# Patient Record
Sex: MALE | Race: WHITE | Employment: OTHER | URBAN - METROPOLITAN AREA
[De-identification: names, ages, dates, MRNs, and addresses within clinical notes are randomized per-mention and may not be internally consistent; named-entity substitution may affect disease eponyms.]

---

## 2018-06-26 ENCOUNTER — HOSPITAL ENCOUNTER (OUTPATIENT)
Age: 73
Discharge: HOME | End: 2018-06-28
Payer: MEDICARE

## 2018-06-26 LAB — PROSTATE SPECIFIC ANTIGEN: 3.11 NG/ML (ref 0–4)

## 2018-06-26 PROCEDURE — G0103 PSA SCREENING: HCPCS

## 2018-06-26 PROCEDURE — 84520 ASSAY OF UREA NITROGEN: CPT

## 2018-06-26 PROCEDURE — 82565 ASSAY OF CREATININE: CPT

## 2018-06-29 LAB
BUN BLDV-MCNC: 20 MG/DL (ref 8–23)
CREAT SERPL-MCNC: 0.7 MG/DL (ref 0.7–1.2)
GFR AFRICAN AMERICAN: >60
GFR NON-AFRICAN AMERICAN: >60 ML/MIN/1.73

## 2021-01-29 ENCOUNTER — IMMUNIZATION (OUTPATIENT)
Dept: PRIMARY CARE CLINIC | Age: 76
End: 2021-01-29
Payer: MEDICARE

## 2021-01-29 PROCEDURE — 0001A COVID-19, PFIZER VACCINE 30MCG/0.3ML DOSE: CPT | Performed by: PHYSICIAN ASSISTANT

## 2021-01-29 PROCEDURE — 91300 COVID-19, PFIZER VACCINE 30MCG/0.3ML DOSE: CPT | Performed by: PHYSICIAN ASSISTANT

## 2021-02-19 ENCOUNTER — IMMUNIZATION (OUTPATIENT)
Dept: PRIMARY CARE CLINIC | Age: 76
End: 2021-02-19
Payer: MEDICARE

## 2021-02-19 PROCEDURE — 91300 COVID-19, PFIZER VACCINE 30MCG/0.3ML DOSE: CPT | Performed by: NURSE PRACTITIONER

## 2021-02-19 PROCEDURE — 0002A COVID-19, PFIZER VACCINE 30MCG/0.3ML DOSE: CPT | Performed by: NURSE PRACTITIONER

## 2021-08-24 ENCOUNTER — TELEPHONE (OUTPATIENT)
Dept: ADMINISTRATIVE | Age: 76
End: 2021-08-24

## 2021-08-24 NOTE — TELEPHONE ENCOUNTER
NP returning call to schedule NP with Cardiology - ASAP. Patient Appointment Form:      PCP: Dr. David Hunt  Referring: Dr. David Hunt    Has the Patient:    Seen a Cardiologist? No    Had a heart catheterization? No    Had heart surgery? No    Had a stress test or nuclear stress test? No    Had an echocardiogram? No    Had a vascular ultrasound? No    Had a 24/48 heart monitor or extended cardiac event monitor? No    Had recent blood work in the last 6 months? Had a pacemaker/ICD/ILR implant? No    Seen an Electrophysiologist? No        Will send records via: PCP recs should be at [address] [place] [address] EKG. Date & time of appointment:  8/30/21 @ 8:30 a.m. with Dr. Nelson Ardon.

## 2021-08-30 ENCOUNTER — OFFICE VISIT (OUTPATIENT)
Dept: CARDIOLOGY CLINIC | Age: 76
End: 2021-08-30
Payer: MEDICARE

## 2021-08-30 VITALS
DIASTOLIC BLOOD PRESSURE: 82 MMHG | HEART RATE: 55 BPM | HEIGHT: 68 IN | SYSTOLIC BLOOD PRESSURE: 129 MMHG | WEIGHT: 150.4 LBS | BODY MASS INDEX: 22.79 KG/M2 | RESPIRATION RATE: 16 BRPM

## 2021-08-30 DIAGNOSIS — I45.2 RBBB (RIGHT BUNDLE BRANCH BLOCK WITH LEFT ANTERIOR FASCICULAR BLOCK): ICD-10-CM

## 2021-08-30 DIAGNOSIS — Z01.810 PREOP CARDIOVASCULAR EXAM: Primary | ICD-10-CM

## 2021-08-30 DIAGNOSIS — M54.50 LOW BACK PAIN, UNSPECIFIED BACK PAIN LATERALITY, UNSPECIFIED CHRONICITY, UNSPECIFIED WHETHER SCIATICA PRESENT: ICD-10-CM

## 2021-08-30 PROBLEM — E78.5 HLD (HYPERLIPIDEMIA): Status: ACTIVE | Noted: 2021-08-30

## 2021-08-30 PROBLEM — I10 HTN (HYPERTENSION): Status: ACTIVE | Noted: 2021-08-30

## 2021-08-30 PROCEDURE — 99204 OFFICE O/P NEW MOD 45 MIN: CPT | Performed by: INTERNAL MEDICINE

## 2021-08-30 PROCEDURE — 93000 ELECTROCARDIOGRAM COMPLETE: CPT | Performed by: INTERNAL MEDICINE

## 2021-08-30 RX ORDER — ROSUVASTATIN CALCIUM 10 MG/1
TABLET, COATED ORAL
COMMUNITY
Start: 2021-07-05

## 2021-08-30 RX ORDER — OMEPRAZOLE 20 MG/1
20 CAPSULE, DELAYED RELEASE ORAL DAILY
COMMUNITY

## 2021-08-30 RX ORDER — PAROXETINE HYDROCHLORIDE 20 MG/1
TABLET, FILM COATED ORAL
COMMUNITY
Start: 2021-08-10

## 2021-08-30 RX ORDER — RAMIPRIL 10 MG/1
CAPSULE ORAL
COMMUNITY
Start: 2021-08-10

## 2021-08-30 RX ORDER — ASPIRIN 81 MG/1
81 TABLET ORAL DAILY
COMMUNITY
End: 2021-08-30 | Stop reason: CLARIF

## 2021-08-30 NOTE — PROGRESS NOTES
Chief Complaint   Patient presents with    Cardiac Clearance       Patient Active Problem List    Diagnosis Date Noted    HTN (hypertension) 08/30/2021    HLD (hyperlipidemia) 08/30/2021       Current Outpatient Medications   Medication Sig Dispense Refill    ramipril (ALTACE) 10 MG capsule TAKE ONE CAPSULE BY MOUTH EVERY DAY      rosuvastatin (CRESTOR) 10 MG tablet TAKE ONE TABLET BY MOUTH EVERY DAY      PARoxetine (PAXIL) 20 MG tablet TAKE ONE TABLET BY MOUTH AT BEDTIME      metFORMIN (GLUCOPHAGE) 500 MG tablet TAKE ONE TABLET BY MOUTH THREE TIMES A DAY      omeprazole (PRILOSEC) 20 MG delayed release capsule Take 20 mg by mouth daily      Multiple Vitamins-Minerals (CENTRUM SILVER 50+MEN PO) Take 1 tablet by mouth daily       No current facility-administered medications for this visit. No Known Allergies    Vitals:    08/30/21 0820   BP: 129/82   Resp: 16   Weight: 150 lb 6.4 oz (68.2 kg)   Height: 5' 8\" (1.727 m)       Social History     Socioeconomic History    Marital status: Unknown     Spouse name: Not on file    Number of children: Not on file    Years of education: Not on file    Highest education level: Not on file   Occupational History    Not on file   Tobacco Use    Smoking status: Not on file   Substance and Sexual Activity    Alcohol use: Not on file    Drug use: Not on file    Sexual activity: Not on file   Other Topics Concern    Not on file   Social History Narrative    Not on file     Social Determinants of Health     Financial Resource Strain:     Difficulty of Paying Living Expenses:    Food Insecurity:     Worried About Running Out of Food in the Last Year:     Oksana of Food in the Last Year:    Transportation Needs:     Lack of Transportation (Medical):      Lack of Transportation (Non-Medical):    Physical Activity:     Days of Exercise per Week:     Minutes of Exercise per Session:    Stress:     Feeling of Stress :    Social Connections:     Frequency of Communication with Friends and Family:     Frequency of Social Gatherings with Friends and Family:     Attends Congregational Services:     Active Member of Clubs or Organizations:     Attends Club or Organization Meetings:     Marital Status:    Intimate Partner Violence:     Fear of Current or Ex-Partner:     Emotionally Abused:     Physically Abused:     Sexually Abused:        History reviewed. No pertinent family history. SUBJECTIVE: Stacia Gant presents to the office today for consult for pre-op evaluation prior to OP back surgery at [facility]  He complains of no new or unstable cardiac symptoms and denies   chest pain, chest pressure/discomfort, claudication, dyspnea, exertional chest pressure/discomfort, fatigue, irregular heart beat, lower extremity edema, near-syncope, orthopnea, palpitations, paroxysmal nocturnal dyspnea, syncope and tachypnea. Golfs, does all household chores. Last ekg was years ago. Review of Systems:   Heart: as above   Lungs: as above   Eyes: denies changes in vision or discharge. Ears: denies changes in hearing or pain. Nose: denies epistaxis or masses   Throat: denies sore throat or trouble swallowing. Neuro: denies numbness, tingling, tremors. Skin: denies rashes or itching. : denies hematuria, dysuria   GI: denies vomiting, diarrhea   Psych: denies mood changed, anxiety, depression. all others negative. Physical Exam   /82   Resp 16   Ht 5' 8\" (1.727 m)   Wt 150 lb 6.4 oz (68.2 kg)   BMI 22.87 kg/m²   Constitutional: Oriented to person, place, and time. Obese No distress. Head: Normocephalic and atraumatic. Eyes: EOM are normal. Pupils are equal, round, and reactive to light. Neck: Normal range of motion. Neck supple. No hepatojugular reflux and no JVD present. Carotid bruit is not present. Cardiovascular: Normal rate, regular rhythm, normal heart sounds and intact distal pulses.   Exam reveals no gallop and no friction rub.  No murmur heard. Pulmonary/Chest: Effort normal and breath sounds normal. No respiratory distress. No wheezes. No rales. Abdominal: Soft. Bowel sounds are normal. No distension and no mass. No tenderness. No rebound and no guarding. Musculoskeletal: Normal range of motion. No edema and no tenderness. Neurological: Alert and oriented to person, place, and time. Skin: Skin is warm and dry. No rash noted. Not diaphoretic. No erythema. Psychiatric: Normal mood and affect. Behavior is normal.     EKG: Sinus bradycardia, RBBB, left axis deviation, rate 55, there are no previous tracings available for comparison. ASSESSMENT AND PLAN:  Patient Active Problem List   Diagnosis    HTN (hypertension)    HLD (hyperlipidemia)       ·  Patient has no high risk clinical predictors of an adverse outcome in surgery, such as recent myocardial infarction, unstable angina, heart failure, rhythm other than sinus, severe obstructive valvular disease,cva, insulin, ckd  · Able to achieve > 4 METS with AODLs  · Normal CV exam   · Multiple risk factors for CAD being addressed by PCP  · EKG with asymptomatic conduction disease - no suggestion of high grade AVB  · Low risk surgical procedure  · RCRI Class I risk (< 1% chance of cardiac complication).        Wilson Huang M.D  [id number] [facility] Card[facility]

## 2021-10-08 ENCOUNTER — TELEPHONE (OUTPATIENT)
Dept: VASCULAR SURGERY | Age: 76
End: 2021-10-08

## 2021-10-08 NOTE — TELEPHONE ENCOUNTER
Received referral from Dr. Livai Flores for abnormal HAJA, left message for patient to schedule appointment with Dr. Sagar Owens.

## 2021-11-02 ENCOUNTER — TELEPHONE (OUTPATIENT)
Dept: VASCULAR SURGERY | Age: 76
End: 2021-11-02

## 2021-11-03 ENCOUNTER — OFFICE VISIT (OUTPATIENT)
Dept: VASCULAR SURGERY | Age: 76
End: 2021-11-03
Payer: MEDICARE

## 2021-11-03 VITALS
BODY MASS INDEX: 22.73 KG/M2 | WEIGHT: 150 LBS | SYSTOLIC BLOOD PRESSURE: 140 MMHG | HEIGHT: 68 IN | DIASTOLIC BLOOD PRESSURE: 80 MMHG

## 2021-11-03 DIAGNOSIS — I73.9 PERIPHERAL VASCULAR DISEASE OF EXTREMITY WITH CLAUDICATION (HCC): Chronic | ICD-10-CM

## 2021-11-03 DIAGNOSIS — I70.223 ATHEROSCLEROSIS OF NATIVE ARTERY OF BOTH LOWER EXTREMITIES WITH REST PAIN (HCC): Primary | ICD-10-CM

## 2021-11-03 PROCEDURE — 99203 OFFICE O/P NEW LOW 30 MIN: CPT | Performed by: SURGERY

## 2021-11-03 RX ORDER — ASPIRIN 81 MG/1
81 TABLET ORAL DAILY
COMMUNITY

## 2021-11-03 NOTE — PROGRESS NOTES
Vascular Surgery Outpatient Consultation        Reason for Consult:    Chief Complaint   Patient presents with   Redd Mondragon Consultation     new pt. complains of leg cramping       Requesting Physician:  No referring provider defined for this encounter. Chief Complaint   Patient presents with    Consultation     new pt. complains of leg cramping       HISTORY OF PRESENT ILLNESS:                The patient is a 68 y.o. male who is referred for evaluation of peripheral vascular disease. He describes left lower extremity pain and discomfort about 30 yards in his calf. He has been limiting his lifestyle. He notices if he walks a little further he starts to get pain into his thigh. He denies any rest pain, lower extremity ulcerations. He smokes on a regular basis 3 cigars/year. He quit for about 10 years in 1991 and then started smoking cigars about 10 years later and has smoked ever since. Prior to [address] he was also smoking. He denies any active chest pain shortness of breath or discomfort. He otherwise is doing well he denies any history of congestive heart failure. .    Past Medical History:        Diagnosis Date    DM (diabetes mellitus) ([facility] [address]     HTN (hypertension)     Hyperlipidemia     Leg pain      Past Surgical History:        Procedure Laterality Date    BACK SURGERY       Current Medications:   Prior to Admission medications    Medication Sig Start Date End Date Taking?  Authorizing Provider   aspirin 81 MG EC tablet Take 81 mg by mouth daily   Yes Historical Provider, MD   ramipril (ALTACE) 10 MG capsule TAKE ONE CAPSULE BY MOUTH EVERY DAY 8/10/21  Yes Historical Provider, MD   rosuvastatin (CRESTOR) 10 MG tablet TAKE ONE TABLET BY MOUTH EVERY DAY 7/5/21  Yes Historical Provider, MD   PARoxetine (PAXIL) 20 MG tablet TAKE ONE TABLET BY MOUTH AT BEDTIME 8/10/21  Yes Historical Provider, MD   metFORMIN (GLUCOPHAGE) 500 MG tablet TAKE ONE TABLET BY MOUTH THREE TIMES A DAY 7/28/21  Yes Historical Provider, MD   omeprazole (PRILOSEC) 20 MG delayed release capsule Take 20 mg by mouth daily   Yes Historical Provider, MD   Multiple Vitamins-Minerals (CENTRUM SILVER 50+MEN PO) Take 1 tablet by mouth daily   Yes Historical Provider, MD     Allergies:  Patient has no known allergies. Social History     Socioeconomic History    Marital status: Unknown     Spouse name: Not on file    Number of children: Not on file    Years of education: Not on file    Highest education level: Not on file   Occupational History    Not on file   Tobacco Use    Smoking status: Current Every Day Smoker     Types: Cigars    Smokeless tobacco: Never Used   Substance and Sexual Activity    Alcohol use: Yes     Alcohol/week: 4.0 standard drinks     Types: 4 Cans of beer per week    Drug use: Never    Sexual activity: Not on file   Other Topics Concern    Not on file   Social History Narrative    Not on file     Social Determinants of Health     Financial Resource Strain:     Difficulty of Paying Living Expenses:    Food Insecurity:     Worried About Running Out of Food in the Last Year:     [address] in the Last Year:    Transportation Needs:     Lack of Transportation (Medical):  Lack of Transportation (Non-Medical):    Physical Activity:     Days of Exercise per Week:     Minutes of Exercise per Session:    Stress:     Feeling of Stress :    Social Connections:     Frequency of Communication with Friends and Family:     Frequency of Social Gatherings with Friends and Family:     Attends Pentecostalism Services:     Active Member of Clubs or Organizations:     Attends Club or Organization Meetings:     Marital Status:    Intimate Partner Violence:     Fear of Current or Ex-Partner:     Emotionally Abused:     Physically Abused:     Sexually Abused:         History reviewed. No pertinent family history.     REVIEW OF SYSTEMS (New symptoms):    Eyes:      Blurred vision:  No [x]/Yes []               Diplopia:   No [x]/Yes []               Vision loss:       No [x]/Yes []   Ears, nose, throat:             Hearing loss:    No [x]/Yes []      Vertigo:   No [x]/Yes []                       Swallowing problem:  No [x]/Yes []               Nose bleeds:   No [x]/Yes []      Voice hoarseness:  No [x]/Yes []  Respiratory:             Cough:   No [x]/Yes []      Pleuritic chest pain:  No [x]/Yes []                        Dyspnea:   No [x]/Yes []      Wheezing:   No [x]/Yes []  Cardiovascular:             Angina:   No [x]/Yes []      Palpitations:   No [x]/Yes []          Claudication:    No [x]/Yes []      Leg swelling:   No [x]/Yes []  Gastrointestinal:             Nausea or vomiting:  No [x]/Yes []               Abdominal pain:  No [x]/Yes []                     Intestinal bleeding: No [x]/Yes []  Musculoskeletal:             Leg pain:   No [x]/Yes []      Back pain:   No [x]/Yes []                    Weakness:   No [x]/Yes []  Neurologic:             Numbness:   No [x]/Yes []      Paralysis:   No [x]/Yes []                       Headaches:   No [x]/Yes []  Hematologic, lymphatic:   Anemia:   No [x]/Yes []              Bleeding or bruising:  No [x]/Yes []              Fevers or chills: No [x]/Yes []  Endocrine:             Temp intolerance:   No [x]/Yes []                       Polydipsia, polyuria:  No [x]/Yes []  Skin:              Rash:    No [x]/Yes []      Ulcers:   No [x]/Yes []              Abnorm pigment: No [x]/Yes []  :              Frequency/urgency:  No [x]/Yes []      Hematuria:    No [x]/Yes []                      Incontinence:    No [x]/Yes []  See history of present illness  PHYSICAL EXAM:  Vitals:    11/03/21 0917   BP: (!) 140/80     General Appearance: alert and oriented to person, place and time, well developed and well- nourished, in no acute distress  Skin: warm and dry, no rash or erythema  Head: normocephalic and atraumatic  Eyes: extraocular eye movements intact, conjunctivae normal  ENT: external ear and ear canal normal bilaterally, nose without deformity  Pulmonary/Chest: clear to auscultation bilaterally- no wheezes, rales or rhonchi, normal air movement, no respiratory distress  Cardiovascular: normal rate, regular rhythm, normal S1 and S2, no murmurs, no carotid bruits  Abdomen: soft, non-tender, non-distended, normal bowel sounds, no masses or organomegaly  Musculoskeletal: normal range of motion, no joint swelling, deformity or tenderness  Neurologic: no cranial nerve deficit, gait, coordination and speech normal  Extremities: Bilateral palpable brachial radial pulses are symmetric. Right femorals palpable popliteals palpable. Posterior tibials palpable. Signals present in the DP    Left extremity demonstrates nonpalpable femoral.  The popliteal is nonpalpable. Signals are present in the posterior tibial.    Problem List Items Addressed This Visit     Peripheral vascular disease of extremity with claudication (HCC) (Chronic)    Relevant Medications    aspirin 81 MG EC tablet            #1 1 peripheral vascular disease. At this point he has a nonpalpable left femoral pulse. This is causing him lifestyle limiting claudication. Were to get a CT of the abdomen pelvis and runoff. My suspicion is most likely has inflow disease based on the physical examination. He will keep a detailed log when he is walking status. We will also prescribe him cilostazol. He has no history of congestive heart failure. I reviewed the cardiology notes and this was corroborated with the patient. No follow-ups on file.

## 2021-11-04 ENCOUNTER — TELEPHONE (OUTPATIENT)
Dept: VASCULAR SURGERY | Age: 76
End: 2021-11-04

## 2021-11-04 RX ORDER — CILOSTAZOL 100 MG/1
100 TABLET ORAL 2 TIMES DAILY
Qty: 60 TABLET | Refills: 3 | Status: ON HOLD
Start: 2021-11-04 | End: 2022-01-24 | Stop reason: HOSPADM

## 2021-11-04 NOTE — TELEPHONE ENCOUNTER
Pt phoned, explained to him the reason we were given by his insurance company for denying our request for CTA. He will begin Pletal, walk as much as possible and follow up with Dr. Sagar Owens x 3 months.

## 2022-01-12 ENCOUNTER — OFFICE VISIT (OUTPATIENT)
Dept: VASCULAR SURGERY | Age: 77
End: 2022-01-12
Payer: MEDICARE

## 2022-01-12 VITALS — SYSTOLIC BLOOD PRESSURE: 116 MMHG | DIASTOLIC BLOOD PRESSURE: 60 MMHG

## 2022-01-12 DIAGNOSIS — I73.9 PERIPHERAL VASCULAR DISEASE OF EXTREMITY WITH CLAUDICATION (HCC): Primary | ICD-10-CM

## 2022-01-12 PROCEDURE — 99213 OFFICE O/P EST LOW 20 MIN: CPT | Performed by: SURGERY

## 2022-01-12 NOTE — PROGRESS NOTES
Vascular Surgery Outpatient Consultation        Reason for Consult:    Chief Complaint   Patient presents with    Circulatory Problem     Follow up PVD, pain left hand. Requesting Physician:  No referring provider defined for this encounter. Chief Complaint   Patient presents with    Circulatory Problem     Follow up PVD, pain left hand. HISTORY OF PRESENT ILLNESS:                The patient is a 68 y.o. male who is referred for evaluation of peripheral vascular disease. He describes left lower extremity pain and discomfort about 30 yards in his calf. He has been limiting his lifestyle. He notices if he walks a little further he starts to get pain into his thigh. He denies any rest pain, lower extremity ulcerations. He smokes on a regular basis 3 cigars/year. He quit for about 10 years in 1991 and then started smoking cigars about 10 years later and has smoked ever since. Prior to [address] he was also smoking. He denies any active chest pain shortness of breath or discomfort. He otherwise is doing well he denies any history of congestive heart failure. .    As above. With the exception adding Pletal.  Overall this has not helped. He still having significant lifestyle limiting claudication where it is impairing his daily physical activity and regular routine. Past Medical History:        Diagnosis Date    DM (diabetes mellitus) ([facility] [address]     HTN (hypertension)     Hyperlipidemia     Leg pain      Past Surgical History:        Procedure Laterality Date    BACK SURGERY       Current Medications:   Prior to Admission medications    Medication Sig Start Date End Date Taking?  Authorizing Provider   aspirin 81 MG EC tablet Take 81 mg by mouth daily   Yes Historical Provider, MD   ramipril (ALTACE) 10 MG capsule TAKE ONE CAPSULE BY MOUTH EVERY DAY 8/10/21  Yes Historical Provider, MD   rosuvastatin (CRESTOR) 10 MG tablet TAKE ONE TABLET BY MOUTH EVERY DAY 7/5/21  Yes Historical Provider, MD PARoxetine (PAXIL) 20 MG tablet TAKE ONE TABLET BY MOUTH AT BEDTIME 8/10/21  Yes Historical Provider, MD   metFORMIN (GLUCOPHAGE) 500 MG tablet TAKE ONE TABLET BY MOUTH THREE TIMES A DAY 7/28/21  Yes Historical Provider, MD   omeprazole (PRILOSEC) 20 MG delayed release capsule Take 20 mg by mouth daily   Yes Historical Provider, MD   Multiple Vitamins-Minerals (CENTRUM SILVER 50+MEN PO) Take 1 tablet by mouth daily   Yes Historical Provider, MD   cilostazol (PLETAL) 100 MG tablet Take 1 tablet by mouth 2 times daily  Patient not taking: Reported on 1/12/2022 11/4/21   Eber Pollard MD     Allergies:  Patient has no known allergies. Social History     Socioeconomic History    Marital status: Unknown     Spouse name: Not on file    Number of children: Not on file    Years of education: Not on file    Highest education level: Not on file   Occupational History    Not on file   Tobacco Use    Smoking status: Current Every Day Smoker     Types: Cigars    Smokeless tobacco: Never Used   Substance and Sexual Activity    Alcohol use: Yes     Alcohol/week: 4.0 standard drinks     Types: 4 Cans of beer per week    Drug use: Never    Sexual activity: Not on file   Other Topics Concern    Not on file   Social History Narrative    Not on file     Social Determinants of Health     Financial Resource Strain:     Difficulty of Paying Living Expenses: Not on file   Food Insecurity:     Worried About Running Out of Food in the Last Year: Not on file    Oksana of Food in the Last Year: Not on file   Transportation Needs:     Lack of Transportation (Medical): Not on file    Lack of Transportation (Non-Medical):  Not on file   Physical Activity:     Days of Exercise per Week: Not on file    Minutes of Exercise per Session: Not on file   Stress:     Feeling of Stress : Not on file   Social Connections:     Frequency of Communication with Friends and Family: Not on file    Frequency of Social Gatherings with Friends and Family: Not on file    Attends Restorationism Services: Not on file    Active Member of Clubs or Organizations: Not on file    Attends Club or Organization Meetings: Not on file    Marital Status: Not on file   Intimate Partner Violence:     Fear of Current or Ex-Partner: Not on file    Emotionally Abused: Not on file    Physically Abused: Not on file    Sexually Abused: Not on file   Housing Stability:     Unable to Pay for Housing in the Last Year: Not on file    Number of [place] in the Last Year: Not on file    Unstable Housing in the Last Year: Not on file        History reviewed. No pertinent family history.     REVIEW OF SYSTEMS (New symptoms):    Eyes:      Blurred vision:  No [x]/Yes []               Diplopia:   No [x]/Yes []               Vision loss:       No [x]/Yes []   Ears, nose, throat:             Hearing loss:    No [x]/Yes []      Vertigo:   No [x]/Yes []                       Swallowing problem:  No [x]/Yes []               Nose bleeds:   No [x]/Yes []      Voice hoarseness:  No [x]/Yes []  Respiratory:             Cough:   No [x]/Yes []      Pleuritic chest pain:  No [x]/Yes []                        Dyspnea:   No [x]/Yes []      Wheezing:   No [x]/Yes []  Cardiovascular:             Angina:   No [x]/Yes []      Palpitations:   No [x]/Yes []          Claudication:    No [x]/Yes []      Leg swelling:   No [x]/Yes []  Gastrointestinal:             Nausea or vomiting:  No [x]/Yes []               Abdominal pain:  No [x]/Yes []                     Intestinal bleeding: No [x]/Yes []  Musculoskeletal:             Leg pain:   No [x]/Yes []      Back pain:   No [x]/Yes []                    Weakness:   No [x]/Yes []  Neurologic:             Numbness:   No [x]/Yes []      Paralysis:   No [x]/Yes []                       Headaches:   No [x]/Yes []  Hematologic, lymphatic:   Anemia:   No [x]/Yes []              Bleeding or bruising:  No [x]/Yes []              Fevers or chills: No [x]/Yes []  Endocrine:             Temp intolerance:   No [x]/Yes []                       Polydipsia, polyuria:  No [x]/Yes []  Skin:              Rash:    No [x]/Yes []      Ulcers:   No [x]/Yes []              Abnorm pigment: No [x]/Yes []  :              Frequency/urgency:  No [x]/Yes []      Hematuria:    No [x]/Yes []                      Incontinence:    No [x]/Yes []  See history of present illness  PHYSICAL EXAM:  Vitals:    01/12/22 0856   BP: 116/60     General Appearance: alert and oriented to person, place and time, well developed and well- nourished, in no acute distress  Skin: warm and dry, no rash or erythema  Head: normocephalic and atraumatic  Eyes: extraocular eye movements intact, conjunctivae normal  ENT: external ear and ear canal normal bilaterally, nose without deformity  Pulmonary/Chest: clear to auscultation bilaterally- no wheezes, rales or rhonchi, normal air movement, no respiratory distress  Cardiovascular: normal rate, regular rhythm, normal S1 and S2, no murmurs, no carotid bruits  Abdomen: soft, non-tender, non-distended, normal bowel sounds, no masses or organomegaly  Musculoskeletal: normal range of motion, no joint swelling, deformity or tenderness  Neurologic: no cranial nerve deficit, gait, coordination and speech normal  Extremities: Bilateral palpable brachial radial pulses are symmetric. Right femorals palpable popliteals palpable. Posterior tibials palpable. Signals present in the DP    Left extremity demonstrates nonpalpable femoral.  The popliteal is nonpalpable. Signals are present in the posterior tibial.    The above examination is unchanged. Problem List Items Addressed This Visit     None            #1 1 peripheral vascular disease. With lifestyle limiting claudication. He is tried Pletal and has not had any additional benefit.   At this point I think we can proceed with an arteriogram with possible intervention secondary to the fact that he is having trouble doing regular daily activity. Risk, benefits, and alternatives, were discussed with the patient including but not limited to bleeding, infection, arteriovenous nerve injury, myocardial infarction, respiratory failure, contrast reaction, contrast reaction leading to kidney failure and/or hemodialysis, injury to the good leg, injury to the bed late, distal embolization, emergency surgery, limb loss of either extremity, sensory or motor disturbance. All questions were answered according to their satisfaction and they wish to proceed. No follow-ups on file.

## 2022-01-21 ENCOUNTER — TELEPHONE (OUTPATIENT)
Dept: CARDIAC CATH/INVASIVE PROCEDURES | Age: 77
End: 2022-01-21

## 2022-01-21 NOTE — TELEPHONE ENCOUNTER
Reminded patient of scheduled procedure on 1/24  Instructions given and COVID questionnaire completed.

## 2022-01-24 ENCOUNTER — HOSPITAL ENCOUNTER (OUTPATIENT)
Dept: CARDIAC CATH/INVASIVE PROCEDURES | Age: 77
Discharge: HOME | End: 2022-01-24
Attending: SURGERY | Admitting: SURGERY
Payer: MEDICARE

## 2022-01-24 VITALS
SYSTOLIC BLOOD PRESSURE: 124 MMHG | DIASTOLIC BLOOD PRESSURE: 82 MMHG | TEMPERATURE: 96.1 F | BODY MASS INDEX: 22.73 KG/M2 | RESPIRATION RATE: 16 BRPM | HEART RATE: 52 BPM | WEIGHT: 150 LBS | HEIGHT: 68 IN | OXYGEN SATURATION: 97 %

## 2022-01-24 DIAGNOSIS — I73.9 PERIPHERAL VASCULAR DISEASE OF EXTREMITY WITH CLAUDICATION (HCC): Primary | Chronic | ICD-10-CM

## 2022-01-24 DIAGNOSIS — I73.9 PVD (PERIPHERAL VASCULAR DISEASE) (HCC): ICD-10-CM

## 2022-01-24 DIAGNOSIS — I73.9 PAD (PERIPHERAL ARTERY DISEASE) (HCC): ICD-10-CM

## 2022-01-24 LAB
ABO/RH: NORMAL
ANION GAP SERPL CALCULATED.3IONS-SCNC: 8 MMOL/L (ref 7–16)
ANTIBODY SCREEN: NORMAL
BUN BLDV-MCNC: 24 MG/DL (ref 6–23)
CALCIUM SERPL-MCNC: 9.7 MG/DL (ref 8.6–10.2)
CHLORIDE BLD-SCNC: 104 MMOL/L (ref 98–107)
CO2: 27 MMOL/L (ref 22–29)
CREAT SERPL-MCNC: 0.9 MG/DL (ref 0.7–1.2)
GFR AFRICAN AMERICAN: >60
GFR NON-AFRICAN AMERICAN: >60 ML/MIN/1.73
GLUCOSE BLD-MCNC: 126 MG/DL (ref 74–99)
HCT VFR BLD CALC: 47.9 % (ref 37–54)
HEMOGLOBIN: 16.6 G/DL (ref 12.5–16.5)
MCH RBC QN AUTO: 32.5 PG (ref 26–35)
MCHC RBC AUTO-ENTMCNC: 34.7 % (ref 32–34.5)
MCV RBC AUTO: 93.9 FL (ref 80–99.9)
PDW BLD-RTO: 12.9 FL (ref 11.5–15)
PLATELET # BLD: 170 E9/L (ref 130–450)
PMV BLD AUTO: 11.2 FL (ref 7–12)
POC ACT LR: 159 SECONDS
POC ACT LR: 324 SECONDS
POTASSIUM REFLEX MAGNESIUM: 4.3 MMOL/L (ref 3.5–5)
RBC # BLD: 5.1 E12/L (ref 3.8–5.8)
SODIUM BLD-SCNC: 139 MMOL/L (ref 132–146)
WBC # BLD: 8.8 E9/L (ref 4.5–11.5)

## 2022-01-24 PROCEDURE — 85027 COMPLETE CBC AUTOMATED: CPT

## 2022-01-24 PROCEDURE — 2580000003 HC RX 258: Performed by: NURSE PRACTITIONER

## 2022-01-24 PROCEDURE — 75716 ARTERY X-RAYS ARMS/LEGS: CPT

## 2022-01-24 PROCEDURE — C1894 INTRO/SHEATH, NON-LASER: HCPCS

## 2022-01-24 PROCEDURE — 6370000000 HC RX 637 (ALT 250 FOR IP)

## 2022-01-24 PROCEDURE — 86901 BLOOD TYPING SEROLOGIC RH(D): CPT

## 2022-01-24 PROCEDURE — 2500000003 HC RX 250 WO HCPCS

## 2022-01-24 PROCEDURE — 85347 COAGULATION TIME ACTIVATED: CPT

## 2022-01-24 PROCEDURE — 37221 PR REVSC OPN/PRQ ILIAC ART W/STNT PLMT & ANGIOPLSTY: CPT | Performed by: SURGERY

## 2022-01-24 PROCEDURE — C1760 CLOSURE DEV, VASC: HCPCS

## 2022-01-24 PROCEDURE — 75716 ARTERY X-RAYS ARMS/LEGS: CPT | Performed by: SURGERY

## 2022-01-24 PROCEDURE — 36200 PLACE CATHETER IN AORTA: CPT

## 2022-01-24 PROCEDURE — 86850 RBC ANTIBODY SCREEN: CPT

## 2022-01-24 PROCEDURE — 37221 HC ILIAC TERRITORY PLASTY STENT: CPT

## 2022-01-24 PROCEDURE — 75625 CONTRAST EXAM ABDOMINL AORTA: CPT | Performed by: SURGERY

## 2022-01-24 PROCEDURE — 2709999900 HC NON-CHARGEABLE SUPPLY

## 2022-01-24 PROCEDURE — 6360000002 HC RX W HCPCS

## 2022-01-24 PROCEDURE — 80048 BASIC METABOLIC PNL TOTAL CA: CPT

## 2022-01-24 PROCEDURE — 36415 COLL VENOUS BLD VENIPUNCTURE: CPT

## 2022-01-24 PROCEDURE — 86900 BLOOD TYPING SEROLOGIC ABO: CPT

## 2022-01-24 PROCEDURE — C1769 GUIDE WIRE: HCPCS

## 2022-01-24 PROCEDURE — C1874 STENT, COATED/COV W/DEL SYS: HCPCS

## 2022-01-24 PROCEDURE — C1887 CATHETER, GUIDING: HCPCS

## 2022-01-24 RX ORDER — SODIUM CHLORIDE 0.9 % (FLUSH) 0.9 %
10 SYRINGE (ML) INJECTION PRN
Status: DISCONTINUED | OUTPATIENT
Start: 2022-01-24 | End: 2022-01-24 | Stop reason: HOSPADM

## 2022-01-24 RX ORDER — HYDRALAZINE HYDROCHLORIDE 20 MG/ML
10 INJECTION INTRAMUSCULAR; INTRAVENOUS EVERY 30 MIN PRN
Status: DISCONTINUED | OUTPATIENT
Start: 2022-01-24 | End: 2022-01-24 | Stop reason: HOSPADM

## 2022-01-24 RX ORDER — SODIUM CHLORIDE 0.9 % (FLUSH) 0.9 %
10 SYRINGE (ML) INJECTION EVERY 12 HOURS SCHEDULED
Status: DISCONTINUED | OUTPATIENT
Start: 2022-01-24 | End: 2022-01-24 | Stop reason: HOSPADM

## 2022-01-24 RX ORDER — SODIUM CHLORIDE 9 MG/ML
25 INJECTION, SOLUTION INTRAVENOUS PRN
Status: DISCONTINUED | OUTPATIENT
Start: 2022-01-24 | End: 2022-01-24 | Stop reason: HOSPADM

## 2022-01-24 RX ORDER — CLOPIDOGREL BISULFATE 75 MG/1
75 TABLET ORAL DAILY
Qty: 30 TABLET | Refills: 3 | Status: SHIPPED | OUTPATIENT
Start: 2022-01-24 | End: 2022-08-30

## 2022-01-24 RX ORDER — SODIUM CHLORIDE 9 MG/ML
INJECTION, SOLUTION INTRAVENOUS CONTINUOUS
Status: DISCONTINUED | OUTPATIENT
Start: 2022-01-24 | End: 2022-01-24 | Stop reason: HOSPADM

## 2022-01-24 RX ADMIN — SODIUM CHLORIDE, PRESERVATIVE FREE 10 ML: 5 INJECTION INTRAVENOUS at 10:51

## 2022-01-24 RX ADMIN — SODIUM CHLORIDE: 9 INJECTION, SOLUTION INTRAVENOUS at 10:52

## 2022-01-24 ASSESSMENT — PAIN SCALES - GENERAL
PAINLEVEL_OUTOF10: 0
PAINLEVEL_OUTOF10: 0

## 2022-01-24 NOTE — PROGRESS NOTES
Discharge instructions printed and reviewed with patient and wife at the bedside. IV removed, dressing applied. Telemetry removed, cleaned, returned to nurses station. Patient in possession of all bedside belongings upon discharge from the unit.

## 2022-01-24 NOTE — OP NOTE
Operative Note      Patient: Shai Stahl  YOB: 1945  MRN: [id number]    Date of procedure: 1/24/2022    Preoperative diagnosis: Lifestyle limiting claudication    Postoperative diagnosis: Same with identification of a left-sided iliac artery occlusion    Findings: The abdominal aorta is widely patent. Calcific atherosclerotic disease is noted. There is no evidence of stenosis. Bilateral renals are patent. Nephrograms are patent. The right extremity demonstrates a common iliac that demonstrates approximately 40% stenosis with some mild Po stenotic dilatation. The external iliac, hypogastric, common femoral, superficial femoral, profunda femoral, popliteal, are all patent. Dominant runoff appears to be the right posterior tibial discontinuous onto the foot. The trifurcation is patent but slow to fill. Left lower extremity demonstrates a hypogastric that is patent and external iliac arteries patent, common femoral, profunda femoris, superficial femoral, popliteal are all patent. Trifurcations patent with a dominant runoff being the posterior tibial vessel. And slow filling of the peroneal and an anterior tibial bilaterally. Surgeon: Thao Monaco MD    Assistant: None    Anesthesia: Conscious sedation consisting of fentanyl, Versed, and cardiopulmonary monitoring combined with local lidocaine    Estimated blood loss: Less than 20 cc    Estimated contrast: See nursing notes    Procedure:  #1 bilateral ultrasound-guided needle access the common femoral arteries  #2 abdominal aortogram with bilateral lower extremity runoff  #3 bilateral iliac artery stent placement. Consisting of a 9 x 38 on the right.   In the 9 x 59 left iliac stent  #4 percutaneous closure of the right common femoral artery and failed percutaneous closure of the left common femoral vessel    Description of the procedure:    Risk, benefits, and alternatives, were discussed with the patient including but not limited to bleeding, infection, arteriovenous nerve injury, myocardial infarction, respiratory failure, contrast reaction, contrast reaction leading to kidney failure and/or hemodialysis, injury to the good leg, injury to the bed late, distal embolization, emergency surgery, limb loss of either extremity, sensory or motor disturbance. All questions were answered according to their satisfaction and they wish to proceed. The day the procedure he was brought into the operating room placed in supine position. He is prepped and draped in the standard sterile fashion. Timeout was taken verify the person the operative site as well as the procedure. At this point lidocaine was used infiltrate the skin and subcutaneous tissue on the right side. We able to puncture this without any difficulty with a micropuncture needle micropuncture wire and sheath. A 4 [place] sheath was placed over a [place] wire. A pigtail catheter was brought up to level suprarenal aortic position and image. We also took RICE and Monegasque imaging at the bifurcation. At this point the wire was maintained. We then punctured the left common femoral vessel in a similar style fashion. We then upsized to a 5 Western Rona sheath flashed and flushed with heparinized saline solution. A glide catheter and a Glidewire advantage was brought to the level of the occlusion. We tried to traverse into the occlusion however it was not easily going. We then upsized the right 4 Western Rona sheath to a 7 Western Rona Ansell sheath. We delivered a neville hook style catheter into the origin of the left common iliac. We used a Glidewire advantage and were easily able to traverse over the left iliac vessel. A glide catheter was then delivered images confirming demonstrated intraluminal placement. Runoff was then taken of the left extremity see above    At this point we are in good position. Patient was heparinized.   We then dilated the left common iliac artery with the sheath and dilator over the wire. We then used the left femoral access point to deliver a new Glidewire advantage without any difficulty in the abdominal aorta. A glide catheter was used to follow and confirm intraluminal placement. We then upsized to a 7 Western Rona Brite tip sheath. Images were taken in the RICE Italian position to make sure that we are good position. A 9 x 38 and a 9 x 59 stent were delivered sheaths were then removed. Images taken demonstrating that we are above the level of the hypogastrics. Both stents were deployed without any difficulty the sheaths the balloon was then swallowed by both sheath advancing the right and left sheath in the abdominal aorta. Completion images demonstrated a widely patent stent without evidence of complicating features. The Ansell sheath was then pulled back into the external iliac on the right and the right extremity was imaged see above. We are in good position for percutaneous closure. The right was done without any difficulty over wire. The wire was removed and pressure held. The left was attempted. However the Pro-glide did not seat very well I did like the way it felt the Pro-glide was removed and a short 7 Wolof sheath was placed over standard wire. There was some oozing around the sheath the patient was given protamine. Once the ACT was adequate the sheath was removed and pressure held. At the end of the procedure everything was accounted for and the patient tolerated procedure well.         Electronically signed by Stephan Flannery MD on 1/24/2022 at 11:00 AM

## 2022-02-01 ENCOUNTER — TELEPHONE (OUTPATIENT)
Dept: VASCULAR SURGERY | Age: 77
End: 2022-02-01

## 2022-02-02 ENCOUNTER — OFFICE VISIT (OUTPATIENT)
Dept: VASCULAR SURGERY | Age: 77
End: 2022-02-02
Payer: MEDICARE

## 2022-02-02 VITALS — DIASTOLIC BLOOD PRESSURE: 82 MMHG | SYSTOLIC BLOOD PRESSURE: 128 MMHG

## 2022-02-02 DIAGNOSIS — I73.9 PERIPHERAL VASCULAR DISEASE OF EXTREMITY WITH CLAUDICATION (HCC): Primary | ICD-10-CM

## 2022-02-02 PROCEDURE — 99213 OFFICE O/P EST LOW 20 MIN: CPT | Performed by: SURGERY

## 2022-02-02 NOTE — PROGRESS NOTES
Vascular Surgery Outpatient Consultation        Reason for Consult:    Chief Complaint   Patient presents with    Circulatory Problem     Follow up PVD       Requesting Physician:  No referring provider defined for this encounter. Chief Complaint   Patient presents with    Circulatory Problem     Follow up PVD       HISTORY OF PRESENT ILLNESS:                The patient is a 68 y.o. male who is referred for evaluation of peripheral vascular disease. He describes left lower extremity pain and discomfort about 30 yards in his calf. He has been limiting his lifestyle. He notices if he walks a little further he starts to get pain into his thigh. He denies any rest pain, lower extremity ulcerations. He smokes on a regular basis 3 cigars/year. He quit for about 10 years in 1991 and then started smoking cigars about 10 years later and has smoked ever since. Prior to [address] [facility] he was also smoking. He denies any active chest pain shortness of breath or discomfort. He otherwise is doing well he denies any history of congestive heart failure. .    As above. Right now he status post iliac stent placement. Overall he is doing well he has no pain no discomfort no embolic phenomenon. He is walking without any difficulty has no claudication. He has cut down on his smoking but is still smoking. He is following up with his family doctor for his recheck labs    Past Medical History:        Diagnosis Date    Arthritis     DM (diabetes mellitus) ([address]     HTN (hypertension)     Hyperlipidemia     Leg pain      Past Surgical History:        Procedure Laterality Date    BACK SURGERY      HX VASCULAR STENT  01/24/2022    [id number] ICAST stent - Right iliac artery, [id number] ICAST stent- Left iliac artery Dr. Debbie Madrigal     Current Medications:   Prior to Admission medications    Medication Sig Start Date End Date Taking?  Authorizing Provider   metFORMIN (GLUCOPHAGE) 500 MG tablet Take 500 mg by mouth 2 times daily (with meals)   Yes Historical Provider, MD   clopidogrel (PLAVIX) 75 MG tablet Take 1 tablet by mouth daily for 30 doses 1/24/22 2/23/22 Yes Alma Hughes MD   aspirin 81 MG EC tablet Take 81 mg by mouth daily   Yes Historical Provider, MD   rosuvastatin (CRESTOR) 10 MG tablet TAKE ONE TABLET BY MOUTH EVERY DAY 7/5/21  Yes Historical Provider, MD   PARoxetine (PAXIL) 20 MG tablet TAKE ONE TABLET BY MOUTH AT BEDTIME 8/10/21  Yes Historical Provider, MD   omeprazole (PRILOSEC) 20 MG delayed release capsule Take 20 mg by mouth daily   Yes Historical Provider, MD   ramipril (ALTACE) 10 MG capsule TAKE ONE CAPSULE BY MOUTH EVERY DAY  Patient not taking: Reported on 2/2/2022 8/10/21   Historical Provider, MD   Multiple Vitamins-Minerals (CENTRUM SILVER 50+MEN PO) Take 1 tablet by mouth daily  Patient not taking: Reported on 2/2/2022    Historical Provider, MD     Allergies:  Patient has no known allergies. Social History     Socioeconomic History    Marital status: Unknown     Spouse name: Not on file    Number of children: Not on file    Years of education: Not on file    Highest education level: Not on file   Occupational History    Not on file   Tobacco Use    Smoking status: Current Every Day Smoker     Types: Cigars    Smokeless tobacco: Never Used   Vaping Use    Vaping Use: Never used   Substance and Sexual Activity    Alcohol use: Yes     Alcohol/week: 4.0 standard drinks     Types: 4 Cans of beer per week    Drug use: Never    Sexual activity: Not on file   Other Topics Concern    Not on file   Social History Narrative    Not on file     Social Determinants of Health     Financial Resource Strain:     Difficulty of Paying Living Expenses: Not on file   Food Insecurity:     Worried About Running Out of Food in the Last Year: Not on file    Oksana of Food in the Last Year: Not on file   Transportation Needs:     Lack of Transportation (Medical):  Not on file    Lack of Transportation (Non-Medical): Not on file   Physical Activity:     Days of Exercise per Week: Not on file    Minutes of Exercise per Session: Not on file   Stress:     Feeling of Stress : Not on file   Social Connections:     Frequency of Communication with Friends and Family: Not on file    Frequency of Social Gatherings with Friends and Family: Not on file    Attends [employer] Services: Not on file    Active Member of [address] [place] [address] or Organizations: Not on file    Attends Club or Organization Meetings: Not on file    Marital Status: Not on file   Intimate Partner Violence:     Fear of Current or Ex-Partner: Not on file    Emotionally Abused: Not on file    Physically Abused: Not on file    Sexually Abused: Not on file   Housing Stability:     Unable to Pay for Housing in the Last Year: Not on file    Number of [place] in the Last Year: Not on file    Unstable Housing in the Last Year: Not on file        History reviewed. No pertinent family history.     REVIEW OF SYSTEMS (New symptoms):    Eyes:      Blurred vision:  No [x]/Yes []               Diplopia:   No [x]/Yes []               Vision loss:       No [x]/Yes []   Ears, nose, throat:             Hearing loss:    No [x]/Yes []      Vertigo:   No [x]/Yes []                       Swallowing problem:  No [x]/Yes []               Nose bleeds:   No [x]/Yes []      Voice hoarseness:  No [x]/Yes []  Respiratory:             Cough:   No [x]/Yes []      Pleuritic chest pain:  No [x]/Yes []                        Dyspnea:   No [x]/Yes []      Wheezing:   No [x]/Yes []  Cardiovascular:             Angina:   No [x]/Yes []      Palpitations:   No [x]/Yes []          Claudication:    No [x]/Yes []      Leg swelling:   No [x]/Yes []  Gastrointestinal:             Nausea or vomiting:  No [x]/Yes []               Abdominal pain:  No [x]/Yes []                     Intestinal bleeding: No [x]/Yes []  Musculoskeletal:             Leg pain:   No [x]/Yes []      Back pain:   No [x]/Yes []                    Weakness:   No [x]/Yes []  Neurologic:             Numbness:   No [x]/Yes []      Paralysis:   No [x]/Yes []                       Headaches:   No [x]/Yes []  Hematologic, lymphatic:   Anemia:   No [x]/Yes []              Bleeding or bruising:  No [x]/Yes []              Fevers or chills: No [x]/Yes []  Endocrine:             Temp intolerance:   No [x]/Yes []                       Polydipsia, polyuria:  No [x]/Yes []  Skin:              Rash:    No [x]/Yes []      Ulcers:   No [x]/Yes []              Abnorm pigment: No [x]/Yes []  :              Frequency/urgency:  No [x]/Yes []      Hematuria:    No [x]/Yes []                      Incontinence:    No [x]/Yes []  See history of present illness  PHYSICAL EXAM:  Vitals:    02/02/22 0821   BP: 128/82     General Appearance: alert and oriented to person, place and time, well developed and well- nourished, in no acute distress  Skin: warm and dry, no rash or erythema, some ecchymosis of the left groin. But there is no pulsatile mass  Head: normocephalic and atraumatic  Eyes: extraocular eye movements intact, conjunctivae normal  ENT: external ear and ear canal normal bilaterally, nose without deformity  Pulmonary/Chest: clear to auscultation bilaterally- no wheezes, rales or rhonchi, normal air movement, no respiratory distress  Cardiovascular: normal rate, regular rhythm, normal S1 and S2, no murmurs, no carotid bruits  Abdomen: soft, non-tender, non-distended, normal bowel sounds, no masses or organomegaly  Musculoskeletal: normal range of motion, no joint swelling, deformity or tenderness  Neurologic: no cranial nerve deficit, gait, coordination and speech normal  Extremities: Bilateral palpable brachial and radial pulses. Bilateral symmetrical femoral pulses. DP PT are 1+. Motor and sensation are intact.     Problem List Items Addressed This Visit     Peripheral vascular disease of extremity with claudication ([address] - Primary (Chronic) #1 1 peripheral vascular disease. Status post bilateral iliac stent placement. This is resulted claudication. I counseled him again on smoking cessation as well as antiplatelet therapy and statin therapy. He will follow-up with his family doctor for his repeat labs. He will follow-up with us in 6 months with repeat ABIs. He will call with any questions or concerns. I discussed the natural history of peripheral vascular disease including smoking and what to expect if he continues to do so. No follow-ups on file.

## 2022-05-18 ENCOUNTER — OFFICE VISIT (OUTPATIENT)
Dept: CARDIOLOGY CLINIC | Age: 77
End: 2022-05-18
Payer: MEDICARE

## 2022-05-18 VITALS
BODY MASS INDEX: 22.25 KG/M2 | WEIGHT: 146.8 LBS | DIASTOLIC BLOOD PRESSURE: 78 MMHG | HEART RATE: 42 BPM | SYSTOLIC BLOOD PRESSURE: 136 MMHG | RESPIRATION RATE: 14 BRPM | HEIGHT: 68 IN

## 2022-05-18 DIAGNOSIS — R07.2 PRECORDIAL PAIN: ICD-10-CM

## 2022-05-18 DIAGNOSIS — I48.91 ATRIAL FIBRILLATION, UNSPECIFIED TYPE (HCC): Primary | ICD-10-CM

## 2022-05-18 PROCEDURE — 99215 OFFICE O/P EST HI 40 MIN: CPT | Performed by: INTERNAL MEDICINE

## 2022-05-18 PROCEDURE — 93000 ELECTROCARDIOGRAM COMPLETE: CPT | Performed by: INTERNAL MEDICINE

## 2022-05-18 RX ORDER — APIXABAN 5 MG/1
TABLET, FILM COATED ORAL
COMMUNITY
Start: 2022-05-10

## 2022-05-18 RX ORDER — METOPROLOL SUCCINATE 25 MG/1
12.5 TABLET, EXTENDED RELEASE ORAL
COMMUNITY
Start: 2022-05-10

## 2022-05-18 NOTE — PROGRESS NOTES
Chief Complaint   Patient presents with    Atrial Fibrillation       Patient Active Problem List    Diagnosis Date Noted    Atrial fibrillation ([facility] 05/18/2022     Priority: Medium     Overview Note:     A.  CHADSVASC = 5      PVD (peripheral vascular disease) ([facility] 01/24/2022    PAD (peripheral artery disease) ([facility] 01/24/2022    Peripheral vascular disease of extremity with claudication (HCC) 11/03/2021    HTN (hypertension) 08/30/2021    HLD (hyperlipidemia) 08/30/2021    RBBB (right bundle branch block with left anterior fascicular block) 08/30/2021       Current Outpatient Medications   Medication Sig Dispense Refill    metoprolol succinate (TOPROL XL) 25 MG extended release tablet TAKE ONE TABLET BY MOUTH EVERY DAY      ELIQUIS 5 MG TABS tablet TAKE ONE TABLET BY MOUTH TWO TIMES A DAY      empagliflozin (JARDIANCE) 10 MG tablet Take 10 mg by mouth daily      metFORMIN (GLUCOPHAGE) 500 MG tablet Take 500 mg by mouth 2 times daily (with meals)      clopidogrel (PLAVIX) 75 MG tablet Take 1 tablet by mouth daily for 30 doses 30 tablet 3    aspirin 81 MG EC tablet Take 81 mg by mouth daily      ramipril (ALTACE) 10 MG capsule TAKE ONE CAPSULE BY MOUTH EVERY DAY      rosuvastatin (CRESTOR) 10 MG tablet TAKE ONE TABLET BY MOUTH EVERY DAY      PARoxetine (PAXIL) 20 MG tablet TAKE ONE TABLET BY MOUTH AT BEDTIME      omeprazole (PRILOSEC) 20 MG delayed release capsule Take 20 mg by mouth daily      Multiple Vitamins-Minerals (CENTRUM SILVER 50+MEN PO) Take 1 tablet by mouth daily        Current Facility-Administered Medications   Medication Dose Route Frequency Provider Last Rate Last Admin    regadenoson (LEXISCAN) injection 0.4 mg  0.4 mg IntraVENous ONCE PRN Nneka Rangel MD        perflutren lipid microspheres (DEFINITY) injection 1.65 mg  1.5 mL IntraVENous ONCE PRN Nneka Rangel MD            No Known Allergies    Vitals:    05/18/22 0829   BP: 136/78   Pulse: (!) 42   Resp: 14   Weight: 146 lb 12.8 oz (66.6 kg)   Height: 5' 8\" (1.727 m)       Social History     Socioeconomic History    Marital status: Unknown     Spouse name: Not on file    Number of children: Not on file    Years of education: Not on file    Highest education level: Not on file   Occupational History    Not on file   Tobacco Use    Smoking status: Current Every Day Smoker     Types: Cigars    Smokeless tobacco: Never Used   Vaping Use    Vaping Use: Never used   Substance and Sexual Activity    Alcohol use: Yes     Alcohol/week: 9.0 standard drinks     Types: 4 Cans of beer, 5 Glasses of wine per week    Drug use: Never    Sexual activity: Not on file   Other Topics Concern    Not on file   Social History Narrative    Not on file     Social Determinants of Health     Financial Resource Strain:     Difficulty of Paying Living Expenses: Not on file   Food Insecurity:     Worried About Running Out of Food in the Last Year: Not on file    Oksana of Food in the Last Year: Not on file   Transportation Needs:     Lack of Transportation (Medical): Not on file    Lack of Transportation (Non-Medical):  Not on file   Physical Activity:     Days of Exercise per Week: Not on file    Minutes of Exercise per Session: Not on file   Stress:     Feeling of Stress : Not on file   Social Connections:     Frequency of Communication with Friends and Family: Not on file    Frequency of Social Gatherings with Friends and Family: Not on file    Attends [employer] Services: Not on file    Active Member of Clubs or Organizations: Not on file    Attends Club or Organization Meetings: Not on file    Marital Status: Not on file   Intimate Partner Violence:     Fear of Current or Ex-Partner: Not on file    Emotionally Abused: Not on file    Physically Abused: Not on file    Sexually Abused: Not on file   Housing Stability:     Unable to Pay for Housing in the Last Year: Not on file    Number of [place] in the Last Year: Not on file    Unstable Housing in the Last Year: Not on file       History reviewed. No pertinent family history. SUBJECTIVE: Lynn Officer presents to the office today for urgent re-evaluation. Was golfing recently, developed chest pain, taken to [facility] facility in [place], found to have AF, spontaneous conversion to sinus and dc'd from ER on BB and NOAC. He complains of no new or unstable cardiac symptoms apart from that isolated episode. Magy Robles, does all household chores. Hx of peripheral stenting jan 2022 on [facility]       Review of Systems:   Heart: as above   Lungs: as above   Eyes: denies changes in vision or discharge. Ears: denies changes in hearing or pain. Nose: denies epistaxis or masses   Throat: denies sore throat or trouble swallowing. Neuro: denies numbness, tingling, tremors. Skin: denies rashes or itching. : denies hematuria, dysuria   GI: denies vomiting, diarrhea   Psych: denies mood changed, anxiety, depression. all others negative. Physical Exam   /78   Pulse (!) 42   Resp 14   Ht 5' 8\" (1.727 m)   Wt 146 lb 12.8 oz (66.6 kg)   BMI 22.32 kg/m²   Constitutional: Oriented to person, place, and time. Obese No distress. Head: Normocephalic and atraumatic. Eyes: EOM are normal. Pupils are equal, round, and reactive to light. Neck: Normal range of motion. Neck supple. No hepatojugular reflux and no JVD present. Carotid bruit is not present. Cardiovascular: Normal rate, regular rhythm, normal heart sounds and intact distal pulses. Exam reveals no gallop and no friction rub. No murmur heard. Pulmonary/Chest: Effort normal and breath sounds normal. No respiratory distress. No wheezes. No rales. Abdominal: Soft. Bowel sounds are normal. No distension and no mass. No tenderness. No rebound and no guarding. Musculoskeletal: Normal range of motion. No edema and no tenderness. Neurological: Alert and oriented to person, place, and time.    Skin: Skin is warm and dry. No rash noted. Not diaphoretic. No erythema. Psychiatric: Normal mood and affect. Behavior is normal.     EKG: Marked Sinus bradycardia, RBBB, left axis deviation, rate 42    ASSESSMENT AND PLAN:  Patient Active Problem List   Diagnosis    HTN (hypertension)    HLD (hyperlipidemia)    RBBB (right bundle branch block with left anterior fascicular block)    Peripheral vascular disease of extremity with stenting        Chest pain     Atrial fibrillation      New paroxysmal atrial fibrillation with high CHADSVASC score - reviewed diagnosis and ramifications with patient and wife. Will stop ASA and pair Plavix with NOAC to reduce bleeding risk. Drop BB dose to 12.5 mg qd given marked bradycardia, albeit asymptomatic  Will get Lexiscan and echo.    Stop smoking  Raise statin to high intensity dosing level ( 20 mg) if he hasnt demonstrated intolerance in the past        Suma Ho M.D  [facility]

## 2022-05-27 ENCOUNTER — TELEPHONE (OUTPATIENT)
Dept: CARDIOLOGY | Age: 77
End: 2022-05-27

## 2022-05-27 NOTE — TELEPHONE ENCOUNTER
Spoke to the patient on the phone. Reminded of his 745am stress test at [facility]. Cardiology and where to report. He was instructed on NPO after MN except meds with water but to avoid taking his diabetic meds and metoprolol for 24 hours. Covid protocol and questions done. He Verbalized an understanding.

## 2022-06-01 ENCOUNTER — TELEPHONE (OUTPATIENT)
Dept: CARDIOLOGY CLINIC | Age: 77
End: 2022-06-01

## 2022-06-01 ENCOUNTER — HOSPITAL ENCOUNTER (OUTPATIENT)
Dept: CARDIOLOGY | Age: 77
Discharge: HOME | End: 2022-06-01
Payer: MEDICARE

## 2022-06-01 VITALS
SYSTOLIC BLOOD PRESSURE: 144 MMHG | HEIGHT: 68 IN | WEIGHT: 146 LBS | RESPIRATION RATE: 16 BRPM | HEART RATE: 43 BPM | DIASTOLIC BLOOD PRESSURE: 82 MMHG | BODY MASS INDEX: 22.13 KG/M2

## 2022-06-01 DIAGNOSIS — R07.2 PRECORDIAL PAIN: ICD-10-CM

## 2022-06-01 DIAGNOSIS — I48.91 ATRIAL FIBRILLATION, UNSPECIFIED TYPE (HCC): ICD-10-CM

## 2022-06-01 PROCEDURE — 2580000003 HC RX 258: Performed by: INTERNAL MEDICINE

## 2022-06-01 PROCEDURE — 3430000000 HC RX DIAGNOSTIC RADIOPHARMACEUTICAL: Performed by: INTERNAL MEDICINE

## 2022-06-01 PROCEDURE — 93017 CV STRESS TEST TRACING ONLY: CPT

## 2022-06-01 PROCEDURE — A9502 TC99M TETROFOSMIN: HCPCS | Performed by: INTERNAL MEDICINE

## 2022-06-01 PROCEDURE — 78452 HT MUSCLE IMAGE SPECT MULT: CPT

## 2022-06-01 PROCEDURE — 6360000002 HC RX W HCPCS: Performed by: INTERNAL MEDICINE

## 2022-06-01 RX ORDER — SODIUM CHLORIDE 0.9 % (FLUSH) 0.9 %
10 SYRINGE (ML) INJECTION PRN
Status: DISCONTINUED | OUTPATIENT
Start: 2022-06-01 | End: 2022-06-02 | Stop reason: HOSPADM

## 2022-06-01 RX ADMIN — TETROFOSMIN 8.5 MILLICURIE: 0.23 INJECTION, POWDER, LYOPHILIZED, FOR SOLUTION INTRAVENOUS at 08:02

## 2022-06-01 RX ADMIN — SODIUM CHLORIDE, PRESERVATIVE FREE 10 ML: 5 INJECTION INTRAVENOUS at 09:22

## 2022-06-01 RX ADMIN — SODIUM CHLORIDE, PRESERVATIVE FREE 10 ML: 5 INJECTION INTRAVENOUS at 09:21

## 2022-06-01 RX ADMIN — REGADENOSON 0.4 MG: 0.08 INJECTION, SOLUTION INTRAVENOUS at 09:21

## 2022-06-01 RX ADMIN — TETROFOSMIN 26.5 MILLICURIE: 0.23 INJECTION, POWDER, LYOPHILIZED, FOR SOLUTION INTRAVENOUS at 09:20

## 2022-06-01 RX ADMIN — SODIUM CHLORIDE, PRESERVATIVE FREE 10 ML: 5 INJECTION INTRAVENOUS at 08:02

## 2022-06-01 NOTE — TELEPHONE ENCOUNTER
----- Message from Nneka Rangel MD sent at 6/1/2022  3:52 PM EDT -----  Stress normal  Awaiting echo

## 2022-06-01 NOTE — PROCEDURES
[id number] [facility] and Vascular Lab - [address]. [facility], [address] [place] [address]  [phone]               Pharmacologic Stress Nuclear Gated SPECT Study    Name: Cony Webster Account Number: [de-identified]    :  1945          Sex: male         Date of Study:  2022    Height: 5' 8\" (172.7 cm)         Weight: 146 lb (66.2 kg)     Ordering Provider: Hesham Horne MD          PCP: Robert Rosa DO      Cardiologist: Hesham Horne MD             Interpreting Physician: Siena Knott MD  _________________________________________________________________________________    Indication:   Detecting the presence and location of coronary artery disease    Clinical History:   Patient has no known history of coronary artery disease. Resting ECG:    Marked sinus bradycardia with LAFB and RBBB    Procedure:   Pharmacologic stress testing was performed with regadenoson 0.4 mg for 15 seconds. The heart rate was 43 at baseline and haley to 68 beats during the infusion. The blood pressure at baseline was 144/82 and blood pressure at the end of infusion was 126/70. Blood pressure response was normal during the stress procedure. The patient experienced \"woozy\" feeling during the infusion that resolved in recovery. ECG during the infusion did not change. IMAGING: Myocardial perfusion imaging was performed at rest 30-35 minutes following the intravenous injection of 8.5 mCi of (Tc-tetrofosmin) followed by 10 ml of Normal Saline. As per infusion protocol, the patient was injected intravenously with 26.5 mCi of (Tc-tetrofosmin) followed by 10 ml of Normal Saline. Gated post-stress tomographic imaging was performed 45 minutes after stress. FINDINGS: The overall quality of the study was good. Left ventricular cavity size was noted to be bordrline enlarged on both rest and stress studies. There was no transient ischemic dilatation after stress.     Rotational analog analysis demonstrated abnormal patient motion and soft tissue diaphragmatic attenuation. A moderate defect was present in the inferior wall(s) that was  moderate size on the post regadenoson images         The resting images are show no change. Gated SPECT left ventricular ejection fraction was calculated to be 67%, with normal myocardial thickening and wall motion. Impression:    1. Electrocardiographically normal regadenoson infusion with a clinically nonischemic response. 2. Myocardial perfusion imaging was normal with attenuation artifact   3. Overall left ventricular systolic function was normal without regional wall motion abnormalities. 4. Low risk general pharmacologic stress test    Thank you for sending your patient to this [employer].      Electronically signed by Laniey Mcneal MD on 6/1/2022 at 2:19 PM

## 2022-06-30 ENCOUNTER — TELEPHONE (OUTPATIENT)
Dept: VASCULAR SURGERY | Age: 77
End: 2022-06-30

## 2022-06-30 NOTE — TELEPHONE ENCOUNTER
Notified pt wife Isaías Fox that his is Dr peraza for 8-3-2022 was cancelled, and his ultrasound testing is scheduled for 8- at 9 and visit with Dr Phoebe Matute will follow his testing.

## 2022-07-01 ENCOUNTER — TELEPHONE (OUTPATIENT)
Dept: CARDIOLOGY | Age: 77
End: 2022-07-01

## 2022-07-06 ENCOUNTER — TELEPHONE (OUTPATIENT)
Dept: CARDIOLOGY CLINIC | Age: 77
End: 2022-07-06

## 2022-07-06 ENCOUNTER — HOSPITAL ENCOUNTER (OUTPATIENT)
Dept: CARDIOLOGY | Age: 77
Discharge: HOME | End: 2022-07-06
Payer: MEDICARE

## 2022-07-06 DIAGNOSIS — I48.91 ATRIAL FIBRILLATION, UNSPECIFIED TYPE (HCC): ICD-10-CM

## 2022-07-06 DIAGNOSIS — R07.2 PRECORDIAL PAIN: ICD-10-CM

## 2022-07-06 LAB
LV EF: 75 %
LVEF MODALITY: NORMAL

## 2022-07-06 PROCEDURE — 93306 TTE W/DOPPLER COMPLETE: CPT | Performed by: PSYCHIATRY & NEUROLOGY

## 2022-07-06 NOTE — TELEPHONE ENCOUNTER
----- Message from Griselda Hand MD sent at 7/6/2022 10:38 AM EDT -----  Echo no issues, awaiting lexiscan

## 2022-07-06 NOTE — TELEPHONE ENCOUNTER
Patient notified and instructed on echo results. Patient stated he had stress test about a month ago.   Please advise

## 2022-08-08 DIAGNOSIS — I73.9 PERIPHERAL VASCULAR DISEASE (HCC): Primary | ICD-10-CM

## 2022-08-17 ENCOUNTER — HOSPITAL ENCOUNTER (OUTPATIENT)
Dept: CARDIOLOGY | Age: 77
Discharge: HOME | End: 2022-08-17
Payer: MEDICARE

## 2022-08-17 ENCOUNTER — OFFICE VISIT (OUTPATIENT)
Dept: VASCULAR SURGERY | Age: 77
End: 2022-08-17
Payer: MEDICARE

## 2022-08-17 VITALS — SYSTOLIC BLOOD PRESSURE: 116 MMHG | DIASTOLIC BLOOD PRESSURE: 70 MMHG

## 2022-08-17 DIAGNOSIS — I73.9 PERIPHERAL VASCULAR DISEASE (HCC): ICD-10-CM

## 2022-08-17 DIAGNOSIS — I73.9 PAD (PERIPHERAL ARTERY DISEASE) (HCC): Primary | ICD-10-CM

## 2022-08-17 PROCEDURE — 93923 UPR/LXTR ART STDY 3+ LVLS: CPT

## 2022-08-17 PROCEDURE — 99213 OFFICE O/P EST LOW 20 MIN: CPT | Performed by: SURGERY

## 2022-08-17 PROCEDURE — 1124F ACP DISCUSS-NO DSCNMKR DOCD: CPT | Performed by: SURGERY

## 2022-08-17 NOTE — PROGRESS NOTES
Vascular Surgery Outpatient Consultation        Reason for Consult:    Chief Complaint   Patient presents with    Circulatory Problem     Follow up PVD       Requesting Physician:  No referring provider defined for this encounter. Chief Complaint   Patient presents with    Circulatory Problem     Follow up PVD       HISTORY OF PRESENT ILLNESS:                The patient is a 68 y.o. male who is referred for evaluation of peripheral vascular disease. He describes left lower extremity pain and discomfort about 30 yards in his calf. He has been limiting his lifestyle. He notices if he walks a little further he starts to get pain into his thigh. He denies any rest pain, lower extremity ulcerations. He smokes on a regular basis 3 cigars/year. He quit for about 10 years in 1991 and then started smoking cigars about 10 years later and has smoked ever since. Prior to [facility] he was also smoking. He denies any active chest pain shortness of breath or discomfort. He otherwise is doing well he denies any history of congestive heart failure. .    As above. Right now he status post iliac stent placement. Overall he is doing well he has no pain no discomfort no embolic phenomenon. He is walking without any difficulty has no claudication. He has cut down on his smoking but is still smoking. He is following up with his family doctor for his recheck labs    Past Medical History:        Diagnosis Date    Arthritis     DM (diabetes mellitus) ([address]     HTN (hypertension)     Hyperlipidemia     Leg pain      Past Surgical History:        Procedure Laterality Date    BACK SURGERY      HX VASCULAR STENT  01/24/2022    [id number] ICAST stent - Right iliac artery, [id number] ICAST stent- Left iliac artery Dr. Gaetano Gomes     Current Medications:   Prior to Admission medications    Medication Sig Start Date End Date Taking?  Authorizing Provider   metoprolol succinate (TOPROL XL) 25 MG extended release tablet Take 12.5 mg by mouth 5/10/22  Yes Historical Provider, MD   ELIQUIS 5 MG TABS tablet TAKE ONE TABLET BY MOUTH TWO TIMES A DAY 5/10/22  Yes Historical Provider, MD   empagliflozin (JARDIANCE) 10 MG tablet Take 10 mg by mouth daily   Yes Historical Provider, MD   metFORMIN (GLUCOPHAGE) 500 MG tablet Take 500 mg by mouth 2 times daily (with meals)   Yes Historical Provider, MD   ramipril (ALTACE) 10 MG capsule TAKE ONE CAPSULE BY MOUTH EVERY DAY 8/10/21  Yes Historical Provider, MD   rosuvastatin (CRESTOR) 10 MG tablet TAKE ONE TABLET BY MOUTH EVERY DAY 7/5/21  Yes Historical Provider, MD   PARoxetine (PAXIL) 20 MG tablet TAKE ONE TABLET BY MOUTH AT BEDTIME 8/10/21  Yes Historical Provider, MD   omeprazole (PRILOSEC) 20 MG delayed release capsule Take 20 mg by mouth daily   Yes Historical Provider, MD   Multiple Vitamins-Minerals (CENTRUM SILVER 50+MEN PO) Take 1 tablet by mouth daily    Yes Historical Provider, MD   clopidogrel (PLAVIX) 75 MG tablet Take 1 tablet by mouth daily for 30 doses  Patient not taking: Reported on 8/17/2022 1/24/22 5/18/22  Sierra Perez MD   aspirin 81 MG EC tablet Take 81 mg by mouth daily No longer taking  Patient not taking: Reported on 8/17/2022    Historical Provider, MD     Allergies:  Patient has no known allergies. Social History     Socioeconomic History    Marital status:      Spouse name: Not on file    Number of children: Not on file    Years of education: Not on file    Highest education level: Not on file   Occupational History    Not on file   Tobacco Use    Smoking status: Every Day     Types: Cigars    Smokeless tobacco: Never    Tobacco comments:     3 cigars per day   Vaping Use    Vaping Use: Never used   Substance and Sexual Activity    Alcohol use:  Yes     Alcohol/week: 9.0 standard drinks     Types: 5 Glasses of wine, 4 Cans of beer per week     Comment: wine 3-4 days a week    Drug use: Never    Sexual activity: Not on file   Other Topics Concern    Not on file   Social History Narrative    Not on file     Social Determinants of Health     Financial Resource Strain: Not on file   Food Insecurity: Not on file   Transportation Needs: Not on file   Physical Activity: Not on file   Stress: Not on file   Social Connections: Not on file   Intimate Partner Violence: Not on file   Housing Stability: Not on file        No family history on file.     REVIEW OF SYSTEMS (New symptoms):    Eyes:      Blurred vision:  No [x]/Yes []               Diplopia:   No [x]/Yes []               Vision loss:       No [x]/Yes []   Ears, nose, throat:             Hearing loss:    No [x]/Yes []      Vertigo:   No [x]/Yes []                       Swallowing problem:  No [x]/Yes []               Nose bleeds:   No [x]/Yes []      Voice hoarseness:  No [x]/Yes []  Respiratory:             Cough:   No [x]/Yes []      Pleuritic chest pain:  No [x]/Yes []                        Dyspnea:   No [x]/Yes []      Wheezing:   No [x]/Yes []  Cardiovascular:             Angina:   No [x]/Yes []      Palpitations:   No [x]/Yes []          Claudication:    No [x]/Yes []      Leg swelling:   No [x]/Yes []  Gastrointestinal:             Nausea or vomiting:  No [x]/Yes []               Abdominal pain:  No [x]/Yes []                     Intestinal bleeding: No [x]/Yes []  Musculoskeletal:             Leg pain:   No [x]/Yes []      Back pain:   No [x]/Yes []                    Weakness:   No [x]/Yes []  Neurologic:             Numbness:   No [x]/Yes []      Paralysis:   No [x]/Yes []                       Headaches:   No [x]/Yes []  Hematologic, lymphatic:   Anemia:   No [x]/Yes []              Bleeding or bruising:  No [x]/Yes []              Fevers or chills: No [x]/Yes []  Endocrine:             Temp intolerance:   No [x]/Yes []                       Polydipsia, polyuria:  No [x]/Yes []  Skin:              Rash:    No [x]/Yes []      Ulcers:   No [x]/Yes []              Abnorm pigment: No [x]/Yes []  : Frequency/urgency:  No [x]/Yes []      Hematuria:    No [x]/Yes []                      Incontinence:    No [x]/Yes []  See history of present illness  PHYSICAL EXAM:  Vitals:    08/17/22 0953   BP: 116/70       General Appearance: alert and oriented to person, place and time, well developed and well- nourished, in no acute distress  Skin: warm and dry, no rash or erythema, some ecchymosis of the left groin. But there is no pulsatile mass  Head: normocephalic and atraumatic  Eyes: extraocular eye movements intact, conjunctivae normal  ENT: external ear and ear canal normal bilaterally, nose without deformity  Pulmonary/Chest: clear to auscultation bilaterally- no wheezes, rales or rhonchi, normal air movement, no respiratory distress  Cardiovascular: normal rate, regular rhythm, normal S1 and S2, no murmurs, no carotid bruits  Abdomen: soft, non-tender, non-distended, normal bowel sounds, no masses or organomegaly  Musculoskeletal: normal range of motion, no joint swelling, deformity or tenderness  Neurologic: no cranial nerve deficit, gait, coordination and speech normal  Extremities: Bilateral palpable brachial and radial pulses. Bilateral symmetrical femoral pulses. DP PT are 1+. Motor and sensation are intact. Right HAJA is 1.01. Left HAJA is 1.03. Problem List Items Addressed This Visit       PAD (peripheral artery disease) ([facility] - Primary           #1  peripheral vascular disease. Status post bilateral iliac stent placement. Has no leg pain or discomfort. He otherwise is doing well. He still smoking cigars intermittently. From our standpoint he can follow-up in 1 to 2 years. He will call if is any recurrence. Patricia    No follow-ups on file.

## 2022-08-30 RX ORDER — CLOPIDOGREL BISULFATE 75 MG/1
75 TABLET ORAL DAILY
Qty: 30 TABLET | Refills: 5 | Status: SHIPPED | OUTPATIENT
Start: 2022-08-30

## 2023-02-07 DIAGNOSIS — I73.9 PERIPHERAL VASCULAR DISEASE (HCC): Primary | ICD-10-CM

## 2023-02-20 ENCOUNTER — OFFICE VISIT (OUTPATIENT)
Dept: VASCULAR SURGERY | Age: 78
End: 2023-02-20
Payer: MEDICARE

## 2023-02-20 ENCOUNTER — HOSPITAL ENCOUNTER (OUTPATIENT)
Dept: CARDIOLOGY | Age: 78
Discharge: HOME | End: 2023-02-20
Payer: MEDICARE

## 2023-02-20 VITALS — HEIGHT: 68 IN | WEIGHT: 148 LBS | BODY MASS INDEX: 22.43 KG/M2

## 2023-02-20 DIAGNOSIS — I73.9 PVD (PERIPHERAL VASCULAR DISEASE) (HCC): Primary | ICD-10-CM

## 2023-02-20 DIAGNOSIS — I73.9 PERIPHERAL VASCULAR DISEASE (HCC): ICD-10-CM

## 2023-02-20 PROCEDURE — 99213 OFFICE O/P EST LOW 20 MIN: CPT | Performed by: NURSE PRACTITIONER

## 2023-02-20 PROCEDURE — 1124F ACP DISCUSS-NO DSCNMKR DOCD: CPT | Performed by: NURSE PRACTITIONER

## 2023-02-20 PROCEDURE — 93923 UPR/LXTR ART STDY 3+ LVLS: CPT

## 2023-02-20 NOTE — PROGRESS NOTES
Vascular Surgery Outpatient Followup    PCP : Des Rosales DO    Previous lower extremity procedures  1/24/22           Bilateral iliac artery stents        HISTORY OF PRESENT ILLNESS:    The patient is a 68 y.o. male who is here in regards to follow up of their PVD. The patient had been doing well and was able to golf without developing any symptoms. About one month ago he developed right groin pain that shoots to his hip and down the front of his leg to his knee. The symptoms occur at rest and while walking. He denies any symptoms of rest pain or open wounds. He has a history of back issues and previously underwent epidural injections and eventually had back surgery with Dr. Tanisha Gipson. The patient currently smokes cigars. He did quit for about 10 years but is smoking again.      ROS : All others Negative if blank [], Positive if [x]  General Urinary   [] Fevers [] Hematuria   [] Chills [] Dysuria   [] Weight Loss Vascular   Skin [] Claudication   [] Tissue Loss [] Rest Pain   Eyes Neurologic   [] Wears Glasses/Contacts [] Stroke/TIA   [] Vision Changes [] Focal weakness   Respiratory [] Slurred Speech    [] Shortness of breath    Cardiovascular    [] Chest Pain    [] Shortness of breath with exertion    Gastrointestinal    [] Abdominal Pain    [] Melena   [] Hematochezia         Past Medical History:        Diagnosis Date    Arthritis     DM (diabetes mellitus) ([facility]     HTN (hypertension)     Hyperlipidemia     Leg pain      Past Surgical History:        Procedure Laterality Date    BACK SURGERY      HX VASCULAR STENT  01/24/2022    [id number] ICAST stent - Right iliac artery, [id number] ICAST stent- Left iliac artery Dr. Vicente Barrios     Current Medications:   Current Outpatient Medications   Medication Sig Dispense Refill    metoprolol succinate (TOPROL XL) 25 MG extended release tablet Take 12.5 mg by mouth       ELIQUIS 5 MG TABS tablet TAKE ONE TABLET BY MOUTH TWO TIMES A DAY      empagliflozin (JARDIANCE) 10 MG tablet Take 10 mg by mouth daily      metFORMIN (GLUCOPHAGE) 500 MG tablet Take 500 mg by mouth 2 times daily (with meals)      ramipril (ALTACE) 10 MG capsule TAKE ONE CAPSULE BY MOUTH EVERY DAY      rosuvastatin (CRESTOR) 10 MG tablet TAKE ONE TABLET BY MOUTH EVERY DAY      PARoxetine (PAXIL) 20 MG tablet TAKE ONE TABLET BY MOUTH AT BEDTIME      omeprazole (PRILOSEC) 20 MG delayed release capsule Take 20 mg by mouth daily      Multiple Vitamins-Minerals (CENTRUM SILVER 50+MEN PO) Take 1 tablet by mouth daily       clopidogrel (PLAVIX) 75 MG tablet Take 1 tablet by mouth daily (Patient not taking: Reported on 2/20/2023) 30 tablet 5    aspirin 81 MG EC tablet Take 81 mg by mouth daily No longer taking (Patient not taking: Reported on 2/20/2023)       Current Facility-Administered Medications   Medication Dose Route Frequency Provider Last Rate Last Admin    regadenoson (LEXISCAN) injection 0.4 mg  0.4 mg IntraVENous ONCE PRN María Bautista MD        perflutren lipid microspheres (DEFINITY) injection 1.65 mg  1.5 mL IntraVENous ONCE PRN María Bautista MD         Allergies:  Patient has no known allergies. Social History     Socioeconomic History    Marital status:      Spouse name: Not on file    Number of children: Not on file    Years of education: Not on file    Highest education level: Not on file   Occupational History    Not on file   Tobacco Use    Smoking status: Every Day     Types: Cigars    Smokeless tobacco: Never    Tobacco comments:     3 cigars per day   Vaping Use    Vaping Use: Never used   Substance and Sexual Activity    Alcohol use:  Yes     Alcohol/week: 9.0 standard drinks     Types: 5 Glasses of wine, 4 Cans of beer per week     Comment: wine 3-4 days a week    Drug use: Never    Sexual activity: Not on file   Other Topics Concern    Not on file   Social History Narrative    Not on file     Social Determinants of Health     Financial Resource Strain: Not on file   Food Insecurity: Not on file   Transportation Needs: Not on file   Physical Activity: Not on file   Stress: Not on file   Social Connections: Not on file   Intimate Partner Violence: Not on file   Housing Stability: Not on file     No family history on file.  Labs  Lab Results   Component Value Date    WBC 8.8 01/24/2022    HGB 16.6 (H) 01/24/2022    HCT 47.9 01/24/2022     01/24/2022    K 4.3 01/24/2022    BUN 24 (H) 01/24/2022    CREATININE 0.9 01/24/2022     PHYSICAL EXAM:    Ht 5' 8\" (1.727 m)   Wt 148 lb (67.1 kg)   BMI 22.50 kg/m²   CONSTITUTIONAL:   Awake, alert, cooperative  PSYCHIATRIC :  Oriented to time, place and person     Appropriate insight to disease process  EYES: Lids and lashes normal  ENT:  External ears and nose without lesions   Hearing deficits absent  NECK: Supple, symmetrical, trachea midline   Carotid bruit absent  LUNGS:  No increased work of breathing                 Clear to auscultation  CARDIOVASCULAR:  regular rate and rhythm   ABDOMEN:  soft, non-distended, non-tender  SKIN:   Normal skin color   Texture and turgor normal, no induration  EXTREMITIES:   R LE Edema absent  L LE Edema absent  R femoral 3 L femoral 3   R posterior tibial 3 L posterior tibial 3   R dorsalis pedis biphasic L dorsalis pedis biphasic     RADIOLOGY:  R HAJA 1.04  TBI 0.66  L HAJA 1.06  TBI 0.68    A/P PVD   Arterial studies unchanged- normal ABIs s/p iliac stenting  Patient's symptoms are not consistent with arterial occlusive disease  I feel his symptoms may be coming from his back or hip  I recommend he follow up with Dr. Obregon and/or PCP regarding this  Continue medical management with ASA, Plavix and statin  Emphasized importance of Tobacco cessation  they understood that with tobacco use they are at increased risk of worsening of their pvd and increased risk of limb loss  No indication for surgical intervention at this time  Discussed with patient pathophysiology of pvd, claudication, tissues loss,  rest pain, and potential for limb loss  Discussed with patient symptoms of new onset claudication, tissue loss, rest pain, changes in lower extremity coolness, pain and they understood to go to ER immediately if any symptoms developed  F/U as outpatient in 12 Months with repeat vascular studies    Pt seen and plan reviewed with Dr. Jeannine Hall.      HILARY Duckworth - CNP

## 2023-03-13 ENCOUNTER — HOSPITAL ENCOUNTER (EMERGENCY)
Age: 78
Discharge: HOME | End: 2023-03-14
Attending: EMERGENCY MEDICINE
Payer: MEDICARE

## 2023-03-13 ENCOUNTER — APPOINTMENT (OUTPATIENT)
Dept: GENERAL RADIOLOGY | Age: 78
End: 2023-03-13
Payer: MEDICARE

## 2023-03-13 DIAGNOSIS — I48.91 ATRIAL FIBRILLATION WITH RVR (HCC): Primary | ICD-10-CM

## 2023-03-13 LAB
ANION GAP SERPL CALCULATED.3IONS-SCNC: 14 MMOL/L (ref 7–16)
BASOPHILS ABSOLUTE: 0.06 E9/L (ref 0–0.2)
BASOPHILS RELATIVE PERCENT: 0.5 % (ref 0–2)
BUN BLDV-MCNC: 23 MG/DL (ref 6–23)
CALCIUM SERPL-MCNC: 8.6 MG/DL (ref 8.6–10.2)
CHLORIDE BLD-SCNC: 107 MMOL/L (ref 98–107)
CO2: 20 MMOL/L (ref 22–29)
CREAT SERPL-MCNC: 0.9 MG/DL (ref 0.7–1.2)
EOSINOPHILS ABSOLUTE: 0.15 E9/L (ref 0.05–0.5)
EOSINOPHILS RELATIVE PERCENT: 1.3 % (ref 0–6)
GFR SERPL CREATININE-BSD FRML MDRD: >60 ML/MIN/1.73
GLUCOSE BLD-MCNC: 155 MG/DL (ref 74–99)
HCT VFR BLD CALC: 49.1 % (ref 37–54)
HEMOGLOBIN: 17.3 G/DL (ref 12.5–16.5)
IMMATURE GRANULOCYTES #: 0.03 E9/L
IMMATURE GRANULOCYTES %: 0.3 % (ref 0–5)
LYMPHOCYTES ABSOLUTE: 3.8 E9/L (ref 1.5–4)
LYMPHOCYTES RELATIVE PERCENT: 33.1 % (ref 20–42)
MCH RBC QN AUTO: 33.6 PG (ref 26–35)
MCHC RBC AUTO-ENTMCNC: 35.2 % (ref 32–34.5)
MCV RBC AUTO: 95.3 FL (ref 80–99.9)
MONOCYTES ABSOLUTE: 0.99 E9/L (ref 0.1–0.95)
MONOCYTES RELATIVE PERCENT: 8.6 % (ref 2–12)
NEUTROPHILS ABSOLUTE: 6.46 E9/L (ref 1.8–7.3)
NEUTROPHILS RELATIVE PERCENT: 56.2 % (ref 43–80)
PDW BLD-RTO: 12.5 FL (ref 11.5–15)
PLATELET # BLD: 157 E9/L (ref 130–450)
PMV BLD AUTO: 11.8 FL (ref 7–12)
POTASSIUM SERPL-SCNC: 3.6 MMOL/L (ref 3.5–5)
PRO-BNP: 101 PG/ML (ref 0–450)
RBC # BLD: 5.15 E12/L (ref 3.8–5.8)
SODIUM BLD-SCNC: 141 MMOL/L (ref 132–146)
TROPONIN, HIGH SENSITIVITY: 12 NG/L (ref 0–11)
WBC # BLD: 11.5 E9/L (ref 4.5–11.5)

## 2023-03-13 PROCEDURE — 80048 BASIC METABOLIC PNL TOTAL CA: CPT

## 2023-03-13 PROCEDURE — 85025 COMPLETE CBC W/AUTO DIFF WBC: CPT

## 2023-03-13 PROCEDURE — 96374 THER/PROPH/DIAG INJ IV PUSH: CPT

## 2023-03-13 PROCEDURE — 71045 X-RAY EXAM CHEST 1 VIEW: CPT

## 2023-03-13 PROCEDURE — 93005 ELECTROCARDIOGRAM TRACING: CPT | Performed by: EMERGENCY MEDICINE

## 2023-03-13 PROCEDURE — 84484 ASSAY OF TROPONIN QUANT: CPT

## 2023-03-13 PROCEDURE — 99285 EMERGENCY DEPT VISIT HI MDM: CPT

## 2023-03-13 PROCEDURE — 83880 ASSAY OF NATRIURETIC PEPTIDE: CPT

## 2023-03-13 PROCEDURE — 87636 SARSCOV2 & INF A&B AMP PRB: CPT

## 2023-03-13 PROCEDURE — 2500000003 HC RX 250 WO HCPCS: Performed by: EMERGENCY MEDICINE

## 2023-03-13 RX ORDER — METOPROLOL TARTRATE 5 MG/5ML
5 INJECTION INTRAVENOUS ONCE
Status: COMPLETED | OUTPATIENT
Start: 2023-03-13 | End: 2023-03-13

## 2023-03-13 RX ADMIN — METOPROLOL TARTRATE 5 MG: 5 INJECTION, SOLUTION INTRAVENOUS at 23:37

## 2023-03-13 ASSESSMENT — PAIN - FUNCTIONAL ASSESSMENT: PAIN_FUNCTIONAL_ASSESSMENT: NONE - DENIES PAIN

## 2023-03-14 VITALS
TEMPERATURE: 98.4 F | OXYGEN SATURATION: 94 % | BODY MASS INDEX: 22.81 KG/M2 | HEART RATE: 52 BPM | RESPIRATION RATE: 17 BRPM | SYSTOLIC BLOOD PRESSURE: 133 MMHG | DIASTOLIC BLOOD PRESSURE: 73 MMHG | WEIGHT: 150 LBS

## 2023-03-14 LAB
EKG ATRIAL RATE: 107 BPM
EKG Q-T INTERVAL: 364 MS
EKG QRS DURATION: 142 MS
EKG QTC CALCULATION (BAZETT): 521 MS
EKG R AXIS: -59 DEGREES
EKG T AXIS: 29 DEGREES
EKG VENTRICULAR RATE: 123 BPM
INFLUENZA A: NOT DETECTED
INFLUENZA B: NOT DETECTED
SARS-COV-2 RNA, RT PCR: NOT DETECTED
TROPONIN, HIGH SENSITIVITY: 22 NG/L (ref 0–11)
TROPONIN, HIGH SENSITIVITY: 30 NG/L (ref 0–11)

## 2023-03-14 PROCEDURE — 6370000000 HC RX 637 (ALT 250 FOR IP): Performed by: EMERGENCY MEDICINE

## 2023-03-14 PROCEDURE — 93010 ELECTROCARDIOGRAM REPORT: CPT | Performed by: INTERNAL MEDICINE

## 2023-03-14 PROCEDURE — 84484 ASSAY OF TROPONIN QUANT: CPT

## 2023-03-14 RX ORDER — METOPROLOL SUCCINATE 25 MG/1
25 TABLET, EXTENDED RELEASE ORAL DAILY
Qty: 30 TABLET | Refills: 0 | Status: SHIPPED | OUTPATIENT
Start: 2023-03-14

## 2023-03-14 RX ADMIN — POTASSIUM BICARBONATE 40 MEQ: 782 TABLET, EFFERVESCENT ORAL at 01:12

## 2023-03-14 NOTE — ED PROVIDER NOTES
[facility] ENCOUNTER        Pt Name: Jose M Morgan  MRN: [id number]  [place] 1945  Date of evaluation: 3/13/2023  Provider: Marc Kamara DO  PCP: Nuvia Donato DO  Note Started: 10:36 PM EDT 3/13/23    CHIEF COMPLAINT       Chief Complaint   Patient presents with    Chest Pain     Pt reports chest pain 1hr prior to calling. Pt denies pain at this time. HISTORY OF PRESENT ILLNESS: 1 or more Elements   History From: Patient    Limitations to history : None    Jose M Morgan is a 68 y.o. male who presents to the emergency department for chest pain. The patient states an hour prior to arrival he had sudden onset chest pain while watching TV. Describes it as a tightness in his chest.  Otherwise he had no other symptoms. No shortness of breath. No nausea vomiting. No diaphoresis. No numbness tingling. Nursing Notes were all reviewed and agreed with or any disagreements were addressed in the HPI. REVIEW OF EXTERNAL NOTE :       Office visit from 2/20/2023 with vascular surgery reviewed. Patient was seen for peripheral vascular disease. Pain shins plan had normal ABIs and arterial studies unchanged did not think patient's symptoms were consistent with arterial occlusive disease continue medical management    REVIEW OF SYSTEMS :           Positives and Pertinent negatives as per HPI.      SURGICAL HISTORY     Past Surgical History:   Procedure Laterality Date    BACK SURGERY      HX VASCULAR STENT  01/24/2022    [id number] ICAST stent - Right iliac artery, [id number] ICAST stent- Left iliac artery Dr. Abel Nhi       Discharge Medication List as of 3/14/2023  2:35 AM        CONTINUE these medications which have NOT CHANGED    Details   clopidogrel (PLAVIX) 75 MG tablet Take 1 tablet by mouth daily, Disp-30 tablet, R-5Normal      ELIQUIS 5 MG TABS tablet TAKE ONE TABLET BY MOUTH TWO TIMES A DAY, DAWHistorical Med      empagliflozin (JARDIANCE) 10 MG tablet Take 10 mg by mouth dailyHistorical Med      metFORMIN (GLUCOPHAGE) 500 MG tablet Take 500 mg by mouth 2 times daily (with meals)Historical Med      aspirin 81 MG EC tablet Take 81 mg by mouth daily No longer takingHistorical Med      ramipril (ALTACE) 10 MG capsule TAKE ONE CAPSULE BY MOUTH EVERY DAYHistorical Med      rosuvastatin (CRESTOR) 10 MG tablet TAKE ONE TABLET BY MOUTH EVERY DAYHistorical Med      PARoxetine (PAXIL) 20 MG tablet TAKE ONE TABLET BY MOUTH AT BEDTIMEHistorical Med      omeprazole (PRILOSEC) 20 MG delayed release capsule Take 20 mg by mouth dailyHistorical Med      Multiple Vitamins-Minerals (CENTRUM SILVER 50+MEN PO) Take 1 tablet by mouth daily Historical Med             ALLERGIES     Patient has no known allergies. FAMILYHISTORY     No family history on file. SOCIAL HISTORY       Social History     Tobacco Use    Smoking status: Every Day     Types: Cigars    Smokeless tobacco: Never    Tobacco comments:     3 cigars per day   Vaping Use    Vaping Use: Never used   Substance Use Topics    Alcohol use:  Yes     Alcohol/week: 9.0 standard drinks     Types: 5 Glasses of wine, 4 Cans of beer per week     Comment: wine 3-4 days a week    Drug use: Never       SCREENINGS                         CIWA Assessment  BP: 133/73  Heart Rate: 52           PHYSICAL EXAM  1 or more Elements     ED Triage Vitals [03/13/23 2143]   BP Temp Temp src Heart Rate Resp SpO2 Height Weight   (!) 124/99 98.4 °F (36.9 °C) -- (!) 119 16 98 % -- --            Constitutional/General: Alert and oriented x3  Head: Normocephalic and atraumatic  Eyes: PERRL, EOMI, conjunctiva normal, sclera non icteric  ENT:  Oropharynx clear, handling secretions, no trismus, no asymmetry of the posterior oropharynx or uvular edema  Neck: Supple, full ROM, no stridor, no meningeal signs  Respiratory: Lungs clear to auscultation bilaterally, no wheezes, rales, or rhonchi. Not in respiratory distress  Cardiovascular: Tachycardic, irregularly irregular rhythm no murmurs, no gallops, no rubs. 2+ distal pulses. Equal extremity pulses. Chest: No chest wall tenderness  GI:  Abdomen Soft, Non tender, Non distended. No rebound, guarding, or rigidity. No pulsatile masses. Musculoskeletal: Moves all extremities x 4. Warm and well perfused, no clubbing, no cyanosis, no edema. Capillary refill <3 seconds  Integument: skin warm and dry. No rashes. Neurologic: GCS 15, no focal deficits, symmetric strength 5/5 in the upper and lower extremities bilaterally  Psychiatric: Normal Affect            DIAGNOSTIC RESULTS   LABS:    Labs Reviewed   CBC WITH AUTO DIFFERENTIAL - Abnormal; Notable for the following components:       Result Value    Hemoglobin 17.3 (*)     MCHC 35.2 (*)     Monocytes Absolute 0.99 (*)     All other components within normal limits   BASIC METABOLIC PANEL - Abnormal; Notable for the following components:    CO2 20 (*)     Glucose 155 (*)     All other components within normal limits   TROPONIN - Abnormal; Notable for the following components:    Troponin, High Sensitivity 12 (*)     All other components within normal limits   TROPONIN - Abnormal; Notable for the following components:    Troponin, High Sensitivity 30 (*)     All other components within normal limits   TROPONIN - Abnormal; Notable for the following components:    Troponin, High Sensitivity 22 (*)     All other components within normal limits   COVID-19 & INFLUENZA COMBO   BRAIN NATRIURETIC PEPTIDE       As interpreted by me, the above displayed labs are abnormal. All other labs obtained during this visit were within normal range or not returned as of this dictation. EKG Interpretation  Interpreted by emergency department physician, Ben Velásquez DO      EKG shows A-fib  bpm.  Right bundle branch block. Left anterior fascicular block.   Nonspecific ST-T wave changes. No STEMI. RADIOLOGY:   Non-plain film images such as CT, Ultrasound and MRI are read by the radiologist. Plain radiographic images are visualized and preliminarily interpreted by the ED Provider with the below findings:    Chest x-ray shows no acute cardiopulmonary disease    Interpretation per the Radiologist below, if available at the time of this note:    XR CHEST PORTABLE   Final Result   No acute cardiopulmonary process. No results found. No results found. PROCEDURES   Unless otherwise noted below, none          CRITICAL CARE TIME (.cct)       PAST MEDICAL HISTORY/Chronic Conditions Affecting Care      has a past medical history of Arthritis, DM (diabetes mellitus) ([address] HTN (hypertension), Hyperlipidemia, and Leg pain. EMERGENCY DEPARTMENT COURSE    Vitals:    Vitals:    03/13/23 2338 03/14/23 0117 03/14/23 0214 03/14/23 0247   BP: 121/77   133/73   Pulse: (!) 115 56  52   Resp: 19   17   Temp:       SpO2: 94%   94%   Weight:   150 lb (68 kg)        Patient was given the following medications:  Medications   metoprolol (LOPRESSOR) injection 5 mg (5 mg IntraVENous Given 3/13/23 2337)   potassium bicarb-citric acid (EFFER-K) effervescent tablet 40 mEq (40 mEq Oral Given 3/14/23 0112)           Is this patient to be included in the SEP-1 Core Measure due to severe sepsis or septic shock? No Exclusion criteria - the patient is NOT to be included for SEP-1 Core Measure due to: Infection is not suspected        Medical Decision Making/Differential Diagnosis:    CC/HPI Summary, Social Determinants of health, Records Reviewed, DDx, testing done/not done, ED Course, Reassessment, disposition considerations/shared decision making with patient, consults, disposition:            Medical Decision Making  Amount and/or Complexity of Data Reviewed  Labs: ordered. Radiology: ordered. ECG/medicine tests: ordered. Risk  Prescription drug management.         This is a 71-year-old male presented to the ED for chest pain. Upon arrival to the ED patient's vital signs are within normal limits except for blood pressure 124/99 and a heart rate of 119. Patient did appear to be in A-fib RVR. EKG shows A-fib RVR with a right bundle branch block and left anterior fascicular block. Differential diagnosis includes but is not limited to ACS, electrolyte abnormality, A-fib RVR, arrhythmia. Patient undergo laboratory work-up with CBC BMP troponin BNP EKG chest x-ray as well as COVID and flu test.  Patient will be given Lopressor IV. After receiving IV Lopressor patient converted to normal sinus rhythm and heart rate is controlled. After heart rate was controlled patient symptoms completely resolved. Laboratory work-up did show a normal CBC except for hemoglobin 7.17.3. BMP unremarkable except for bicarb of 20 and a glucose of 155. Troponins were 12 and then increased to 30. At that time I did discuss with patient admission. He states that he since he is asymptomatic he really does not want to stay in the hospital.  Using shared decision making we repeated a third troponin which down trended to 22. Patient is remained asymptomatic. Explained the risks of going home including but not limited to repeat episodes of A-fib RVR, ACS, cardiac mortality and morbidity. Patient understands and accepts risk. Patient did have recent cardiac work-up within the last year with a negative stress test and echo he states he will follow-up with his PCP as well as his cardiologist very closely. He has been given strict return precautions. Patient agrees with plan for discharge home with cardiology follow-up. Patient will increase his metoprolol from 12.5-25. CONSULTS: (Who and What was discussed)  None        I am the Primary Clinician of Record. FINAL IMPRESSION      1.  Atrial fibrillation with RVR [facility])          DISPOSITION/PLAN     DISPOSITION Decision To Discharge 03/14/2023 02:34:45 AM      PATIENT REFERRED TO:  Gil Sharp DO  [address] [phone]    Schedule an appointment as soon as possible for a visit in 2 days      DISCHARGE MEDICATIONS:  Discharge Medication List as of 3/14/2023  2:35 AM          DISCONTINUED MEDICATIONS:  Discharge Medication List as of 3/14/2023  2:35 AM                 (Please note that portions of this note were completed with a voice recognition program.  Efforts were made to edit the dictations but occasionally words are mis-transcribed.)    Lisa Rust DO (electronically signed)            Lisa Rust DO  03/14/23 [facility],   03/14/23 3024

## 2023-03-17 LAB
EKG ATRIAL RATE: 111 BPM
EKG Q-T INTERVAL: 376 MS
EKG QRS DURATION: 132 MS
EKG QTC CALCULATION (BAZETT): 569 MS
EKG R AXIS: -66 DEGREES
EKG T AXIS: 37 DEGREES
EKG VENTRICULAR RATE: 138 BPM

## 2023-03-17 PROCEDURE — 93010 ELECTROCARDIOGRAM REPORT: CPT | Performed by: INTERNAL MEDICINE

## 2023-07-11 PROBLEM — E11.9 TYPE 2 DIABETES MELLITUS WITHOUT COMPLICATION, WITHOUT LONG-TERM CURRENT USE OF INSULIN (HCC): Status: ACTIVE | Noted: 2023-07-11

## 2023-07-11 PROBLEM — E78.00 PURE HYPERCHOLESTEROLEMIA: Status: ACTIVE | Noted: 2021-08-30

## 2023-07-11 PROBLEM — I48.0 PAROXYSMAL ATRIAL FIBRILLATION (HCC): Status: ACTIVE | Noted: 2022-05-18

## 2023-07-19 ENCOUNTER — OFFICE VISIT (OUTPATIENT)
Dept: CARDIOLOGY CLINIC | Age: 78
End: 2023-07-19
Payer: MEDICARE

## 2023-07-19 VITALS
DIASTOLIC BLOOD PRESSURE: 66 MMHG | BODY MASS INDEX: 22.1 KG/M2 | WEIGHT: 145.8 LBS | HEIGHT: 68 IN | SYSTOLIC BLOOD PRESSURE: 116 MMHG | RESPIRATION RATE: 16 BRPM | HEART RATE: 38 BPM | OXYGEN SATURATION: 97 %

## 2023-07-19 DIAGNOSIS — I48.0 PAROXYSMAL ATRIAL FIBRILLATION (HCC): Primary | ICD-10-CM

## 2023-07-19 DIAGNOSIS — I73.9 PERIPHERAL ARTERIAL DISEASE (HCC): ICD-10-CM

## 2023-07-19 DIAGNOSIS — J44.9 CHRONIC OBSTRUCTIVE PULMONARY DISEASE, UNSPECIFIED COPD TYPE (HCC): ICD-10-CM

## 2023-07-19 DIAGNOSIS — I49.5 SINUS NODE DYSFUNCTION (HCC): ICD-10-CM

## 2023-07-19 DIAGNOSIS — E11.9 TYPE 2 DIABETES MELLITUS WITHOUT COMPLICATION, WITHOUT LONG-TERM CURRENT USE OF INSULIN (HCC): ICD-10-CM

## 2023-07-19 DIAGNOSIS — I10 PRIMARY HYPERTENSION: ICD-10-CM

## 2023-07-19 DIAGNOSIS — E78.00 PURE HYPERCHOLESTEROLEMIA: ICD-10-CM

## 2023-07-19 PROCEDURE — 3078F DIAST BP <80 MM HG: CPT | Performed by: INTERNAL MEDICINE

## 2023-07-19 PROCEDURE — 3074F SYST BP LT 130 MM HG: CPT | Performed by: INTERNAL MEDICINE

## 2023-07-19 PROCEDURE — 99215 OFFICE O/P EST HI 40 MIN: CPT | Performed by: INTERNAL MEDICINE

## 2023-07-19 PROCEDURE — 1124F ACP DISCUSS-NO DSCNMKR DOCD: CPT | Performed by: INTERNAL MEDICINE

## 2023-07-19 PROCEDURE — 93000 ELECTROCARDIOGRAM COMPLETE: CPT | Performed by: INTERNAL MEDICINE

## 2023-07-19 NOTE — PROGRESS NOTES
7 day zio xt Monitor was placed per Dr Chris Zhou. Serial number [id number]. Instructions were given & patient verbalized understanding.

## 2023-07-19 NOTE — PROGRESS NOTES
OUTPATIENT CARDIOLOGY FOLLOW-UP    Name: Jigar Blanco    Age: 68 y.o. Primary Care Physician: Cecile Christianson DO    Date of Service: 7/19/2023    Chief Complaint: Paroxysmal atrial fibrillation, possible sinus node dysfunction, chronic obstructive lung disease, hypertension, peripheral arterial disease    Interim History: Since his prior evaluation by Neo Mims, the patient has remained compensated from a cardiovascular standpoint and relates rare episodes of palpitations, most recently approximately 4 months earlier consistent with that of atrial fibrillation. He relates no additional symptoms in conjunction with these episodes and specifically denies episodes of lightheadedness, near syncope or loss of consciousness. He relates no symptoms of a focal neurological general bleed in the face of his chronic anticoagulation. He additionally denies significant progressive symptomatology of claudication in the face of his known peripheral tibial disease. At the time of evaluation he is normotensive with the status of his diabetes and serum lipids unavailable for review. Unfortunately, he has continued routine tobacco consumption. Review of Systems: The remainder of a complete multisystem review including consitutional, central nervous, respiratory, circulatory, gastrointestinal, genitourinary, endocrinologic, hematologic, musculoskeletal and psychiatric are negative. Past Medical History:  Past Medical History:   Diagnosis Date    Arthritis     DM (diabetes mellitus) ([address])     HTN (hypertension)     Hyperlipidemia     Leg pain        Past Surgical History:  Past Surgical History:   Procedure Laterality Date    BACK SURGERY      HX VASCULAR STENT  01/24/2022    [id number] ICAST stent - Right iliac artery, [id number] ICAST stent- Left iliac artery Dr. Natalya Kulkarni       Family History:  No family history on file.     Social History:  Social History     Socioeconomic History    Marital status:

## 2023-08-01 ENCOUNTER — TELEPHONE (OUTPATIENT)
Dept: CARDIOLOGY CLINIC | Age: 78
End: 2023-08-01

## 2023-08-01 DIAGNOSIS — I48.0 PAROXYSMAL ATRIAL FIBRILLATION (HCC): ICD-10-CM

## 2023-08-01 NOTE — TELEPHONE ENCOUNTER
----- Message from Lo Levi MD sent at 8/1/2023  8:37 AM EDT -----  Please notify patient that arrhythmia monitor was reviewed and demonstrates no evidence of significant arrhythmias.   Continue to monitor symptoms and follow-up as scheduled

## 2024-02-13 DIAGNOSIS — I73.9 PVD (PERIPHERAL VASCULAR DISEASE) (HCC): Primary | ICD-10-CM

## 2024-02-26 ENCOUNTER — OFFICE VISIT (OUTPATIENT)
Dept: VASCULAR SURGERY | Age: 79
End: 2024-02-26
Payer: MEDICARE

## 2024-02-26 DIAGNOSIS — I73.9 PVD (PERIPHERAL VASCULAR DISEASE) (HCC): Primary | ICD-10-CM

## 2024-02-26 PROCEDURE — 99213 OFFICE O/P EST LOW 20 MIN: CPT | Performed by: NURSE PRACTITIONER

## 2024-02-26 PROCEDURE — 1124F ACP DISCUSS-NO DSCNMKR DOCD: CPT | Performed by: NURSE PRACTITIONER

## 2024-02-26 NOTE — PROGRESS NOTES
Vascular Surgery Outpatient Followup    PCP : Tahir Beavers Jr., DO    Previous lower extremity procedures  1/24/22           Bilateral iliac artery stents        HISTORY OF PRESENT ILLNESS:    The patient is a 78 y.o. male who is here in regards to follow up of their PVD.    The patient had been doing well. He denies any symptoms of claudication, rest pain, or open wounds.    The patient currently smokes cigars.     ROS : All others Negative if blank [], Positive if [x]  General Urinary   [] Fevers [] Hematuria   [] Chills [] Dysuria   [] Weight Loss Vascular   Skin [] Claudication   [] Tissue Loss [] Rest Pain   Eyes Neurologic   [] Wears Glasses/Contacts [] Stroke/TIA   [] Vision Changes [] Focal weakness   Respiratory [] Slurred Speech    [] Shortness of breath    Cardiovascular    [] Chest Pain    [] Shortness of breath with exertion    Gastrointestinal    [] Abdominal Pain    [] Melena   [] Hematochezia         Past Medical History:        Diagnosis Date    Arthritis     DM (diabetes mellitus) (HCC)     HTN (hypertension)     Hyperlipidemia     Leg pain      Past Surgical History:        Procedure Laterality Date    BACK SURGERY      HX VASCULAR STENT  01/24/2022    [id number] ICAST stent - Right iliac artery, [id number] ICAST stent- Left iliac artery Dr. Gomez     Current Medications:   Current Outpatient Medications   Medication Sig Dispense Refill    metoprolol succinate (TOPROL XL) 25 MG extended release tablet Take 1 tablet by mouth daily 30 tablet 0    ELIQUIS 5 MG TABS tablet TAKE ONE TABLET BY MOUTH TWO TIMES A DAY      empagliflozin (JARDIANCE) 10 MG tablet Take 1 tablet by mouth daily      metFORMIN (GLUCOPHAGE) 500 MG tablet Take 1 tablet by mouth 2 times daily (with meals)      ramipril (ALTACE) 10 MG capsule TAKE ONE CAPSULE BY MOUTH EVERY DAY      rosuvastatin (CRESTOR) 10 MG tablet TAKE ONE TABLET BY MOUTH EVERY DAY      PARoxetine (PAXIL) 20 MG tablet TAKE ONE TABLET BY MOUTH AT BEDTIME

## 2024-03-15 ENCOUNTER — HOSPITAL ENCOUNTER (OUTPATIENT)
Dept: CARDIOLOGY | Age: 79
End: 2024-03-15
Attending: SURGERY
Payer: MEDICARE

## 2024-03-15 DIAGNOSIS — I73.9 PVD (PERIPHERAL VASCULAR DISEASE) (HCC): ICD-10-CM

## 2024-03-15 LAB
VAS LEFT ABI: 1.01
VAS LEFT ARM BP: 160 MMHG
VAS LEFT DORSALIS PEDIS BP: 158 MMHG
VAS LEFT PTA BP: 162 MMHG
VAS LEFT TBI: 0.68
VAS LEFT TOE PRESSURE: 109 MMHG
VAS RIGHT ABI: 1.09
VAS RIGHT ARM BP: 153 MMHG
VAS RIGHT DORSALIS PEDIS BP: 172 MMHG
VAS RIGHT PTA BP: 175 MMHG
VAS RIGHT TBI: 0.61
VAS RIGHT TOE PRESSURE: 97 MMHG

## 2024-03-15 PROCEDURE — 93923 UPR/LXTR ART STDY 3+ LVLS: CPT

## 2024-06-26 ENCOUNTER — OFFICE VISIT (OUTPATIENT)
Dept: CARDIOLOGY CLINIC | Age: 79
End: 2024-06-26
Payer: MEDICARE

## 2024-06-26 VITALS
RESPIRATION RATE: 17 BRPM | HEART RATE: 45 BPM | DIASTOLIC BLOOD PRESSURE: 62 MMHG | SYSTOLIC BLOOD PRESSURE: 124 MMHG | BODY MASS INDEX: 22.43 KG/M2 | WEIGHT: 148 LBS | HEIGHT: 68 IN

## 2024-06-26 DIAGNOSIS — J44.9 CHRONIC OBSTRUCTIVE PULMONARY DISEASE, UNSPECIFIED COPD TYPE (HCC): ICD-10-CM

## 2024-06-26 DIAGNOSIS — E11.9 TYPE 2 DIABETES MELLITUS WITHOUT COMPLICATION, WITHOUT LONG-TERM CURRENT USE OF INSULIN (HCC): ICD-10-CM

## 2024-06-26 DIAGNOSIS — I10 PRIMARY HYPERTENSION: ICD-10-CM

## 2024-06-26 DIAGNOSIS — I73.9 PERIPHERAL ARTERIAL DISEASE (HCC): ICD-10-CM

## 2024-06-26 DIAGNOSIS — I48.0 PAROXYSMAL ATRIAL FIBRILLATION (HCC): Primary | ICD-10-CM

## 2024-06-26 DIAGNOSIS — I49.5 SINUS NODE DYSFUNCTION (HCC): ICD-10-CM

## 2024-06-26 DIAGNOSIS — E78.00 PURE HYPERCHOLESTEROLEMIA: ICD-10-CM

## 2024-06-26 PROCEDURE — 99215 OFFICE O/P EST HI 40 MIN: CPT | Performed by: INTERNAL MEDICINE

## 2024-06-26 PROCEDURE — 93000 ELECTROCARDIOGRAM COMPLETE: CPT | Performed by: INTERNAL MEDICINE

## 2024-06-26 PROCEDURE — 1124F ACP DISCUSS-NO DSCNMKR DOCD: CPT | Performed by: INTERNAL MEDICINE

## 2024-06-26 PROCEDURE — 3074F SYST BP LT 130 MM HG: CPT | Performed by: INTERNAL MEDICINE

## 2024-06-26 PROCEDURE — 3078F DIAST BP <80 MM HG: CPT | Performed by: INTERNAL MEDICINE

## 2024-06-26 NOTE — PROGRESS NOTES
OUTPATIENT CARDIOLOGY FOLLOW-UP    Name: Seth Martinez    Age: 78 y.o.    Primary Care Physician: Tahir Beavers Jr., DO    Date of Service: 6/26/2024    Chief Complaint: Paroxysmal atrial fibrillation, sinus node dysfunction, hypertension, chronic obstructive lung disease, peripheral arterial disease    Interim History: Since his most recent evaluation, the patient remains compensated from a cardiovascular standpoint and denies interim symptoms of anginal-like chest discomfort or other ischemic equivalents, decompensated left ventricular stock dysfunction or volume overload no arrhythmia related manifestations.  He is experience no symptoms of a focal neurological origin nor bleeding in the face of his chronic anticoagulation.  He remains compensated in the face of his peripheral arterial disease with no symptomatology and no changes of his clinical status at the time of his most vascular surgical evaluation.  At the time of his evaluation he continues active tobacco consumption with no changes in the volume of his daily cigar intake.  Presently, he is normotensive with status of his diabetes and serum lipids unavailable for review.    Review of Systems:   The remainder of a complete multisystem review including consitutional, central nervous, respiratory, circulatory, gastrointestinal, genitourinary, endocrinologic, hematologic, musculoskeletal and psychiatric are negative.    Past Medical History:  Past Medical History:   Diagnosis Date    Arthritis     DM (diabetes mellitus) (HCC)     HTN (hypertension)     Hyperlipidemia     Leg pain        Past Surgical History:  Past Surgical History:   Procedure Laterality Date    BACK SURGERY      HX VASCULAR STENT  01/24/2022    [id number] ICAST stent - Right iliac artery, [id number] ICAST stent- Left iliac artery Dr. Gomez       Family History:  No family history on file.    Social History:  Social History     Socioeconomic History    Marital status:

## 2025-04-30 ENCOUNTER — OFFICE VISIT (OUTPATIENT)
Dept: CARDIOLOGY CLINIC | Age: 80
End: 2025-04-30
Payer: MEDICARE

## 2025-04-30 VITALS
HEIGHT: 68 IN | WEIGHT: 149 LBS | RESPIRATION RATE: 18 BRPM | BODY MASS INDEX: 22.58 KG/M2 | DIASTOLIC BLOOD PRESSURE: 76 MMHG | SYSTOLIC BLOOD PRESSURE: 124 MMHG | HEART RATE: 47 BPM

## 2025-04-30 DIAGNOSIS — I45.10 RBBB: ICD-10-CM

## 2025-04-30 DIAGNOSIS — I49.5 SINUS NODE DYSFUNCTION (HCC): ICD-10-CM

## 2025-04-30 DIAGNOSIS — E11.9 TYPE 2 DIABETES MELLITUS WITHOUT COMPLICATION, WITHOUT LONG-TERM CURRENT USE OF INSULIN (HCC): ICD-10-CM

## 2025-04-30 DIAGNOSIS — F17.200 SMOKING ADDICTION: ICD-10-CM

## 2025-04-30 DIAGNOSIS — I48.0 PAROXYSMAL ATRIAL FIBRILLATION (HCC): Primary | ICD-10-CM

## 2025-04-30 DIAGNOSIS — E78.00 PURE HYPERCHOLESTEROLEMIA: ICD-10-CM

## 2025-04-30 PROCEDURE — 93000 ELECTROCARDIOGRAM COMPLETE: CPT | Performed by: INTERNAL MEDICINE

## 2025-04-30 PROCEDURE — G2211 COMPLEX E/M VISIT ADD ON: HCPCS | Performed by: INTERNAL MEDICINE

## 2025-04-30 PROCEDURE — 1159F MED LIST DOCD IN RCRD: CPT | Performed by: INTERNAL MEDICINE

## 2025-04-30 PROCEDURE — 1124F ACP DISCUSS-NO DSCNMKR DOCD: CPT | Performed by: INTERNAL MEDICINE

## 2025-04-30 PROCEDURE — 3074F SYST BP LT 130 MM HG: CPT | Performed by: INTERNAL MEDICINE

## 2025-04-30 PROCEDURE — 3078F DIAST BP <80 MM HG: CPT | Performed by: INTERNAL MEDICINE

## 2025-04-30 PROCEDURE — 99214 OFFICE O/P EST MOD 30 MIN: CPT | Performed by: INTERNAL MEDICINE

## 2025-04-30 NOTE — PATIENT INSTRUCTIONS
Continue all your medications at current doses.   Take the toprol nightly.   Restrict sodium intake to less than 2-2.5 g/day. Restrict fluid intake to less than 2.2 L/day. Goal BP is less than 130/80.   Please try to exercise for 150 minutes a week.   I will see you back in the office in 6 months. Please call the office at [phone] emmanuel Connelly) if you have any questions.

## 2025-04-30 NOTE — PROGRESS NOTES
CHIEF COMPLAINT:   Chief Complaint   Patient presents with    Atrial Fibrillation     Patient has no cardiac complaints        History of Present Illness  Patient is a 79 y.o. male who is here for a follow up visit with me.   He has a history of paroxysmal atrial fibrillation, hypertension, hyperlipidemia, diabetes mellitus, COPD.  He followed with Dr. Do in the past.  Since his last visit, no further visits to the hospital or emergency room    He reports experiencing intermittent episodes of chest heaviness, described as a sensation akin to an object resting on his chest. These episodes typically last for a few minutes before resolving spontaneously. The most recent episode occurred approximately 3 months ago. He does not associate these episodes with stress, anxiety, physical activity, or dietary factors. He has been on a regimen of metoprolol for several years, which he tolerates well without any adverse effects. He does not experience any symptoms of lightheadedness or dizziness.    SOCIAL HISTORY  The patient smokes 2 to 4 cigars a day.    MEDICATIONS  Metoprolol.          At today's office visit, He  denies any shortness of breath, palpitations, dizziness, pedal edema. The patient is capable of activities of daily living. There is dyspnea on more than moderate exertion. There is no orthopnea or PND. He is compliant with medications as well as diet and exercise regimen.    Prior Cardiac workup:  TTE 7/22:   No significant valvular abnormalities.   Normal left ventricle size.   Normal left ventricle wall thickness.   No gross regional wall motion abnormality.   Indeterminate diastolic function.   Overall ejection fraction increased (hyperdynamic) .   Normal left atrium.      MPI 2022:  Electrocardiographically normal regadenoson infusion with a clinically nonischemic response.  Myocardial perfusion imaging was normal with attenuation artifact   Overall left ventricular systolic function was normal without

## 2025-05-02 ENCOUNTER — HOSPITAL ENCOUNTER (OUTPATIENT)
Dept: CARDIOLOGY | Age: 80
Discharge: HOME | End: 2025-05-02
Attending: SURGERY
Payer: MEDICARE

## 2025-05-02 DIAGNOSIS — I73.9 PVD (PERIPHERAL VASCULAR DISEASE): ICD-10-CM

## 2025-05-02 LAB
VAS LEFT ABI: 0.96
VAS LEFT ARM BP: 155 MMHG
VAS LEFT DORSALIS PEDIS BP: 150 MMHG
VAS LEFT PTA BP: 164 MMHG
VAS LEFT TBI: 0.69
VAS LEFT TOE PRESSURE: 117 MMHG
VAS RIGHT ABI: 1.02
VAS RIGHT ARM BP: 170 MMHG
VAS RIGHT DORSALIS PEDIS BP: 170 MMHG
VAS RIGHT PTA BP: 173 MMHG
VAS RIGHT TBI: 0.82
VAS RIGHT TOE PRESSURE: 140 MMHG

## 2025-05-02 PROCEDURE — 93923 UPR/LXTR ART STDY 3+ LVLS: CPT

## 2025-05-19 ENCOUNTER — OFFICE VISIT (OUTPATIENT)
Dept: VASCULAR SURGERY | Age: 80
End: 2025-05-19
Payer: MEDICARE

## 2025-05-19 DIAGNOSIS — I73.9 PVD (PERIPHERAL VASCULAR DISEASE): Primary | ICD-10-CM

## 2025-05-19 PROCEDURE — 99212 OFFICE O/P EST SF 10 MIN: CPT | Performed by: SURGERY

## 2025-05-19 PROCEDURE — 1124F ACP DISCUSS-NO DSCNMKR DOCD: CPT | Performed by: SURGERY

## 2025-05-19 PROCEDURE — 1159F MED LIST DOCD IN RCRD: CPT | Performed by: SURGERY

## 2025-05-19 NOTE — PROGRESS NOTES
Vascular Surgery Outpatient Followup    PCP : Tahir Beavers Jr., DO    Previous lower extremity procedures  1/24/22           Bilateral iliac artery stents        HISTORY OF PRESENT ILLNESS:    The patient is a 79 y.o. male who is here in regards to follow up of their PVD.    The patient had been doing well. He denies any symptoms of claudication, rest pain, or open wounds.    The patient currently smokes cigars.     He is playing golf regularly, 2-3 x a week.  His wife China is doing well.      Past Medical History:        Diagnosis Date    Arthritis     Chronic obstructive pulmonary disease (HCC) 6/26/2024    DM (diabetes mellitus) (HCC)     HTN (hypertension)     Hyperlipidemia     Leg pain      Past Surgical History:        Procedure Laterality Date    BACK SURGERY      HX VASCULAR STENT  01/24/2022    [id number] ICAST stent - Right iliac artery, [id number] ICAST stent- Left iliac artery Dr. Gomez     Current Medications:   Current Outpatient Medications   Medication Sig Dispense Refill    metoprolol succinate (TOPROL XL) 25 MG extended release tablet Take 1 tablet by mouth daily 30 tablet 0    ELIQUIS 5 MG TABS tablet TAKE ONE TABLET BY MOUTH TWO TIMES A DAY      empagliflozin (JARDIANCE) 10 MG tablet Take 1 tablet by mouth daily      metFORMIN (GLUCOPHAGE) 500 MG tablet Take 1 tablet by mouth in the morning, at noon, and at bedtime      ramipril (ALTACE) 10 MG capsule TAKE ONE CAPSULE BY MOUTH EVERY DAY      rosuvastatin (CRESTOR) 10 MG tablet TAKE ONE TABLET BY MOUTH EVERY DAY      PARoxetine (PAXIL) 20 MG tablet TAKE ONE TABLET BY MOUTH AT BEDTIME      omeprazole (PRILOSEC) 20 MG delayed release capsule Take 1 capsule by mouth daily      Multiple Vitamins-Minerals (CENTRUM SILVER 50+MEN PO) Take 1 tablet by mouth daily        No current facility-administered medications for this visit.     Allergies:  Patient has no known allergies.  Social History     Socioeconomic History    Marital status: